# Patient Record
Sex: MALE | Race: WHITE | Employment: FULL TIME | URBAN - METROPOLITAN AREA
[De-identification: names, ages, dates, MRNs, and addresses within clinical notes are randomized per-mention and may not be internally consistent; named-entity substitution may affect disease eponyms.]

---

## 2020-05-26 ENCOUNTER — APPOINTMENT (OUTPATIENT)
Dept: GENERAL RADIOLOGY | Facility: HOSPITAL | Age: 50
End: 2020-05-26
Attending: EMERGENCY MEDICINE
Payer: COMMERCIAL

## 2020-05-26 ENCOUNTER — HOSPITAL ENCOUNTER (EMERGENCY)
Facility: HOSPITAL | Age: 50
Discharge: HOME | End: 2020-05-26
Attending: EMERGENCY MEDICINE
Payer: COMMERCIAL

## 2020-05-26 VITALS
SYSTOLIC BLOOD PRESSURE: 154 MMHG | BODY MASS INDEX: 26.51 KG/M2 | RESPIRATION RATE: 20 BRPM | OXYGEN SATURATION: 99 % | TEMPERATURE: 98 F | HEIGHT: 73 IN | DIASTOLIC BLOOD PRESSURE: 84 MMHG | WEIGHT: 200 LBS | HEART RATE: 102 BPM

## 2020-05-26 DIAGNOSIS — S61.011A LACERATION OF RIGHT THUMB WITHOUT DAMAGE TO NAIL, FOREIGN BODY PRESENCE UNSPECIFIED, INITIAL ENCOUNTER: Primary | ICD-10-CM

## 2020-05-26 PROCEDURE — 99283 EMERGENCY DEPT VISIT LOW MDM: CPT

## 2020-05-26 PROCEDURE — 12002 RPR S/N/AX/GEN/TRNK2.6-7.5CM: CPT

## 2020-05-26 PROCEDURE — 73140 X-RAY EXAM OF FINGER(S): CPT | Performed by: EMERGENCY MEDICINE

## 2020-05-26 PROCEDURE — 90471 IMMUNIZATION ADMIN: CPT

## 2020-05-26 RX ORDER — TETANUS AND DIPHTHERIA TOXOIDS ADSORBED 2; 2 [LF]/.5ML; [LF]/.5ML
0.5 INJECTION INTRAMUSCULAR ONCE
Status: COMPLETED | OUTPATIENT
Start: 2020-05-26 | End: 2020-05-26

## 2020-05-26 RX ORDER — CEPHALEXIN 500 MG/1
500 CAPSULE ORAL 4 TIMES DAILY
Qty: 20 CAPSULE | Refills: 0 | Status: SHIPPED | OUTPATIENT
Start: 2020-05-26 | End: 2020-05-31

## 2020-05-26 NOTE — ED INITIAL ASSESSMENT (HPI)
Pt cut his right thumb on a table saw 10 mins pta. He  Believes his tetanus shot is utd, pt denies any blood thinners.  Cms intact to finger

## 2020-05-26 NOTE — ED PROVIDER NOTES
Patient Seen in: [facility] AND [facility] Emergency Department      History   Patient presents with:  Laceration Abrasion    Stated Complaint:     HPI    78-year-old male presents the ER with complaint of hand laceration.   Patient states he was cutting a piece breath sounds. Abdominal:      General: Bowel sounds are normal.      Palpations: Abdomen is soft. Musculoskeletal: Normal range of motion.       Comments: 7 cm jagged laceration to the palmar aspect of the right thumb, full range of motion of the thumb Refills: 0

## 2020-05-26 NOTE — ED NOTES
Patient cleared for discharge by MD. Lorettas with patient. Patient discharge instructions reviewed with patient including when and how to follow up  with healthcare provider and when to seek medical treatment. Medication use and prescriptions reviewed.

## 2020-06-05 ENCOUNTER — HOSPITAL ENCOUNTER (OUTPATIENT)
Age: 50
Discharge: HOME | End: 2020-06-05
Payer: COMMERCIAL

## 2020-06-05 VITALS
HEART RATE: 86 BPM | OXYGEN SATURATION: 97 % | TEMPERATURE: 98 F | RESPIRATION RATE: 18 BRPM | DIASTOLIC BLOOD PRESSURE: 85 MMHG | SYSTOLIC BLOOD PRESSURE: 141 MMHG

## 2020-06-05 DIAGNOSIS — Z48.02 ENCOUNTER FOR REMOVAL OF SUTURES: ICD-10-CM

## 2020-06-05 DIAGNOSIS — S61.011D LACERATION OF RIGHT THUMB WITHOUT FOREIGN BODY WITHOUT DAMAGE TO NAIL, SUBSEQUENT ENCOUNTER: Primary | ICD-10-CM

## 2020-06-05 DIAGNOSIS — Z51.89 VISIT FOR WOUND CARE: ICD-10-CM

## 2020-06-05 PROBLEM — S61.011A LACERATION OF RIGHT THUMB WITHOUT FOREIGN BODY WITHOUT DAMAGE TO NAIL: Status: RESOLVED | Noted: 2020-06-05 | Resolved: 2020-06-05

## 2020-06-05 NOTE — ED PROVIDER NOTES
Patient Seen in: [address]      History   Patient presents with:  Suture Removal    Stated Complaint: Nacho Lutz is a 45year old male here for suture removal placed 10 days ago in the ER.  0 /10 Capillary Refill: Capillary refill takes less than 2 seconds. Findings: No bruising or erythema. Neurological:      General: No focal deficit present. Mental Status: He is alert and oriented to person, place, and time.    Psychiatric:

## 2020-06-08 ENCOUNTER — TELEPHONE (OUTPATIENT)
Dept: ORTHOPEDICS CLINIC | Facility: CLINIC | Age: 50
End: 2020-06-08

## 2020-06-08 NOTE — TELEPHONE ENCOUNTER
Spoke to patient. Patient states, \"I don't know why I need to be seen, my thumb looks fine, no redness and is healing. \"  Patient instructed if it is recommended to be seen, to make an appointment.    Patient offered an appointment to see Dr. Riaz Hilton,

## 2021-01-01 ENCOUNTER — EXTERNAL RECORD (OUTPATIENT)
Dept: HEALTH INFORMATION MANAGEMENT | Facility: OTHER | Age: 51
End: 2021-01-01

## 2021-01-20 ENCOUNTER — HOSPITAL ENCOUNTER (EMERGENCY)
Facility: HOSPITAL | Age: 51
Discharge: LEFT BEFORE DISCHARGE | DRG: 286 | End: 2021-01-20
Attending: EMERGENCY MEDICINE
Payer: COMMERCIAL

## 2021-01-20 ENCOUNTER — HOSPITAL ENCOUNTER (INPATIENT)
Facility: HOSPITAL | Age: 51
LOS: 7 days | Discharge: HOME | DRG: 286 | End: 2021-01-27
Attending: EMERGENCY MEDICINE | Admitting: HOSPITALIST
Payer: COMMERCIAL

## 2021-01-20 ENCOUNTER — HOSPITAL ENCOUNTER (OUTPATIENT)
Age: 51
Discharge: OTHER INSTITUTION | End: 2021-01-20
Payer: COMMERCIAL

## 2021-01-20 ENCOUNTER — APPOINTMENT (OUTPATIENT)
Dept: ULTRASOUND IMAGING | Facility: HOSPITAL | Age: 51
DRG: 286 | End: 2021-01-20
Attending: EMERGENCY MEDICINE
Payer: COMMERCIAL

## 2021-01-20 ENCOUNTER — APPOINTMENT (OUTPATIENT)
Dept: CT IMAGING | Facility: HOSPITAL | Age: 51
DRG: 286 | End: 2021-01-20
Attending: EMERGENCY MEDICINE
Payer: COMMERCIAL

## 2021-01-20 ENCOUNTER — APPOINTMENT (OUTPATIENT)
Dept: GENERAL RADIOLOGY | Facility: HOSPITAL | Age: 51
DRG: 286 | End: 2021-01-20
Attending: EMERGENCY MEDICINE
Payer: COMMERCIAL

## 2021-01-20 VITALS
HEIGHT: 73 IN | WEIGHT: 206 LBS | RESPIRATION RATE: 22 BRPM | OXYGEN SATURATION: 95 % | HEART RATE: 115 BPM | TEMPERATURE: 97 F | DIASTOLIC BLOOD PRESSURE: 94 MMHG | SYSTOLIC BLOOD PRESSURE: 123 MMHG | BODY MASS INDEX: 27.3 KG/M2

## 2021-01-20 VITALS
DIASTOLIC BLOOD PRESSURE: 92 MMHG | RESPIRATION RATE: 18 BRPM | HEART RATE: 124 BPM | TEMPERATURE: 98 F | OXYGEN SATURATION: 98 % | SYSTOLIC BLOOD PRESSURE: 132 MMHG

## 2021-01-20 DIAGNOSIS — I50.9 ACUTE ON CHRONIC CONGESTIVE HEART FAILURE, UNSPECIFIED HEART FAILURE TYPE (HCC): Primary | ICD-10-CM

## 2021-01-20 DIAGNOSIS — I26.93 SINGLE SUBSEGMENTAL PULMONARY EMBOLISM WITHOUT ACUTE COR PULMONALE (HCC): ICD-10-CM

## 2021-01-20 DIAGNOSIS — I26.94 MULTIPLE SUBSEGMENTAL PULMONARY EMBOLI WITHOUT ACUTE COR PULMONALE (HCC): ICD-10-CM

## 2021-01-20 DIAGNOSIS — R60.9 EDEMA, UNSPECIFIED TYPE: Primary | ICD-10-CM

## 2021-01-20 DIAGNOSIS — I50.9 ACUTE CONGESTIVE HEART FAILURE, UNSPECIFIED HEART FAILURE TYPE (HCC): Primary | ICD-10-CM

## 2021-01-20 LAB
ALBUMIN SERPL-MCNC: 3.1 G/DL (ref 3.4–5)
ALP LIVER SERPL-CCNC: 53 U/L
ALT SERPL-CCNC: 79 U/L
ANION GAP SERPL CALC-SCNC: 3 MMOL/L (ref 0–18)
ANION GAP SERPL CALC-SCNC: 6 MMOL/L (ref 0–18)
APTT PPP: 26.3 SECONDS (ref 23.2–35.3)
APTT PPP: 30 SECONDS (ref 23.2–35.3)
APTT PPP: 81.2 SECONDS (ref 23.2–35.3)
AST SERPL-CCNC: 44 U/L (ref 15–37)
BASOPHILS # BLD AUTO: 0.03 X10(3) UL (ref 0–0.2)
BASOPHILS NFR BLD AUTO: 0.5 %
BILIRUB DIRECT SERPL-MCNC: 0.4 MG/DL (ref 0–0.2)
BILIRUB SERPL-MCNC: 1 MG/DL (ref 0.1–2)
BILIRUB UR QL: NEGATIVE
BUN BLD-MCNC: 11 MG/DL (ref 7–18)
BUN BLD-MCNC: 14 MG/DL (ref 7–18)
BUN/CREAT SERPL: 11.2 (ref 10–20)
BUN/CREAT SERPL: 8.6 (ref 10–20)
CALCIUM BLD-MCNC: 8.7 MG/DL (ref 8.5–10.1)
CALCIUM BLD-MCNC: 9.1 MG/DL (ref 8.5–10.1)
CHLORIDE SERPL-SCNC: 108 MMOL/L (ref 98–112)
CHLORIDE SERPL-SCNC: 109 MMOL/L (ref 98–112)
CHOLEST SERPL-MCNC: 145 MG/DL
CHOLEST SMN-MCNC: 145 MG/DL (ref ?–200)
CLARITY UR: CLEAR
CO2 SERPL-SCNC: 27 MMOL/L (ref 21–32)
CO2 SERPL-SCNC: 27 MMOL/L (ref 21–32)
COLOR UR: COLORLESS
CREAT BLD-MCNC: 1.25 MG/DL
CREAT BLD-MCNC: 1.28 MG/DL
DEPRECATED RDW RBC AUTO: 44 FL (ref 35.1–46.3)
DEPRECATED RDW RBC AUTO: 44.8 FL (ref 35.1–46.3)
EOSINOPHIL # BLD AUTO: 0.02 X10(3) UL (ref 0–0.7)
EOSINOPHIL NFR BLD AUTO: 0.3 %
ERYTHROCYTE [DISTWIDTH] IN BLOOD BY AUTOMATED COUNT: 12.3 % (ref 11–15)
ERYTHROCYTE [DISTWIDTH] IN BLOOD BY AUTOMATED COUNT: 12.4 % (ref 11–15)
GLUCOSE BLD-MCNC: 121 MG/DL (ref 70–99)
GLUCOSE BLD-MCNC: 91 MG/DL (ref 70–99)
GLUCOSE UR-MCNC: NEGATIVE MG/DL
HCT VFR BLD AUTO: 46.4 %
HCT VFR BLD AUTO: 50.6 %
HDLC SERPL-MCNC: 36 MG/DL
HDLC SERPL-MCNC: 36 MG/DL (ref 40–59)
HGB BLD-MCNC: 15 G/DL
HGB BLD-MCNC: 16.6 G/DL
HGB UR QL STRIP.AUTO: NEGATIVE
IMM GRANULOCYTES # BLD AUTO: 0.03 X10(3) UL (ref 0–1)
IMM GRANULOCYTES NFR BLD: 0.5 %
INR BLD: 1.22 (ref 0.9–1.2)
KETONES UR-MCNC: NEGATIVE MG/DL
LDLC SERPL CALC-MCNC: 92 MG/DL
LDLC SERPL CALC-MCNC: 92 MG/DL (ref ?–100)
LEUKOCYTE ESTERASE UR QL STRIP.AUTO: NEGATIVE
LYMPHOCYTES # BLD AUTO: 0.69 X10(3) UL (ref 1–4)
LYMPHOCYTES NFR BLD AUTO: 12 %
M PROTEIN MFR SERPL ELPH: 5.8 G/DL (ref 6.4–8.2)
MCH RBC QN AUTO: 31.6 PG (ref 26–34)
MCH RBC QN AUTO: 31.7 PG (ref 26–34)
MCHC RBC AUTO-ENTMCNC: 32.3 G/DL (ref 31–37)
MCHC RBC AUTO-ENTMCNC: 32.8 G/DL (ref 31–37)
MCV RBC AUTO: 96.2 FL
MCV RBC AUTO: 98.1 FL
MONOCYTES # BLD AUTO: 0.46 X10(3) UL (ref 0.1–1)
MONOCYTES NFR BLD AUTO: 8 %
NEUTROPHILS # BLD AUTO: 4.52 X10 (3) UL (ref 1.5–7.7)
NEUTROPHILS # BLD AUTO: 4.52 X10(3) UL (ref 1.5–7.7)
NEUTROPHILS NFR BLD AUTO: 78.7 %
NITRITE UR QL STRIP.AUTO: NEGATIVE
NONHDLC SERPL-MCNC: 109 MG/DL
NONHDLC SERPL-MCNC: 109 MG/DL (ref ?–130)
NT-PROBNP SERPL-MCNC: 8193 PG/ML (ref ?–125)
OSMOLALITY SERPL CALC.SUM OF ELEC: 289 MOSM/KG (ref 275–295)
OSMOLALITY SERPL CALC.SUM OF ELEC: 292 MOSM/KG (ref 275–295)
PH UR: 6 [PH] (ref 5–8)
PLATELET # BLD AUTO: 191 10(3)UL (ref 150–450)
PLATELET # BLD AUTO: 232 10(3)UL (ref 150–450)
POTASSIUM SERPL-SCNC: 3.8 MMOL/L (ref 3.5–5.1)
POTASSIUM SERPL-SCNC: 4.3 MMOL/L (ref 3.5–5.1)
PROT UR-MCNC: NEGATIVE MG/DL
PROTHROMBIN TIME: 15.2 SECONDS (ref 11.8–14.5)
RBC # BLD AUTO: 4.73 X10(6)UL
RBC # BLD AUTO: 5.26 X10(6)UL
SARS-COV-2 RNA RESP QL NAA+PROBE: NOT DETECTED
SODIUM SERPL-SCNC: 139 MMOL/L (ref 136–145)
SODIUM SERPL-SCNC: 141 MMOL/L (ref 136–145)
SP GR UR STRIP: 1.01 (ref 1–1.03)
TRIGL SERPL-MCNC: 83 MG/DL
TRIGL SERPL-MCNC: 83 MG/DL (ref 30–149)
TROPONIN I SERPL-MCNC: 0.23 NG/ML (ref ?–0.04)
TROPONIN I SERPL-MCNC: 0.23 NG/ML (ref ?–0.04)
TROPONIN I SERPL-MCNC: 0.26 NG/ML (ref ?–0.04)
UROBILINOGEN UR STRIP-ACNC: <2
VLDLC SERPL CALC-MCNC: 17 MG/DL
VLDLC SERPL CALC-MCNC: 17 MG/DL (ref 0–30)
WBC # BLD AUTO: 5.8 X10(3) UL (ref 4–11)
WBC # BLD AUTO: 6.2 X10(3) UL (ref 4–11)

## 2021-01-20 PROCEDURE — 99254 IP/OBS CNSLTJ NEW/EST MOD 60: CPT | Performed by: INTERNAL MEDICINE

## 2021-01-20 PROCEDURE — 3079F DIAST BP 80-89 MM HG: CPT | Performed by: HOSPITALIST

## 2021-01-20 PROCEDURE — 71045 X-RAY EXAM CHEST 1 VIEW: CPT | Performed by: EMERGENCY MEDICINE

## 2021-01-20 PROCEDURE — 93005 ELECTROCARDIOGRAM TRACING: CPT

## 2021-01-20 PROCEDURE — 93010 ELECTROCARDIOGRAM REPORT: CPT | Performed by: EMERGENCY MEDICINE

## 2021-01-20 PROCEDURE — 99214 OFFICE O/P EST MOD 30 MIN: CPT | Performed by: NURSE PRACTITIONER

## 2021-01-20 PROCEDURE — 84484 ASSAY OF TROPONIN QUANT: CPT | Performed by: EMERGENCY MEDICINE

## 2021-01-20 PROCEDURE — 85025 COMPLETE CBC W/AUTO DIFF WBC: CPT | Performed by: EMERGENCY MEDICINE

## 2021-01-20 PROCEDURE — 99285 EMERGENCY DEPT VISIT HI MDM: CPT

## 2021-01-20 PROCEDURE — 3074F SYST BP LT 130 MM HG: CPT | Performed by: HOSPITALIST

## 2021-01-20 PROCEDURE — 93970 EXTREMITY STUDY: CPT | Performed by: EMERGENCY MEDICINE

## 2021-01-20 PROCEDURE — 80048 BASIC METABOLIC PNL TOTAL CA: CPT | Performed by: EMERGENCY MEDICINE

## 2021-01-20 PROCEDURE — 83880 ASSAY OF NATRIURETIC PEPTIDE: CPT | Performed by: EMERGENCY MEDICINE

## 2021-01-20 PROCEDURE — 80076 HEPATIC FUNCTION PANEL: CPT | Performed by: EMERGENCY MEDICINE

## 2021-01-20 PROCEDURE — 99213 OFFICE O/P EST LOW 20 MIN: CPT | Performed by: NURSE PRACTITIONER

## 2021-01-20 PROCEDURE — 71260 CT THORAX DX C+: CPT | Performed by: EMERGENCY MEDICINE

## 2021-01-20 PROCEDURE — 85730 THROMBOPLASTIN TIME PARTIAL: CPT | Performed by: EMERGENCY MEDICINE

## 2021-01-20 PROCEDURE — 80061 LIPID PANEL: CPT | Performed by: EMERGENCY MEDICINE

## 2021-01-20 PROCEDURE — 96374 THER/PROPH/DIAG INJ IV PUSH: CPT

## 2021-01-20 PROCEDURE — 85610 PROTHROMBIN TIME: CPT | Performed by: EMERGENCY MEDICINE

## 2021-01-20 PROCEDURE — 99223 1ST HOSP IP/OBS HIGH 75: CPT | Performed by: HOSPITALIST

## 2021-01-20 RX ORDER — HEPARIN SODIUM AND DEXTROSE 10000; 5 [USP'U]/100ML; G/100ML
INJECTION INTRAVENOUS CONTINUOUS
Status: DISCONTINUED | OUTPATIENT
Start: 2021-01-20 | End: 2021-01-20

## 2021-01-20 RX ORDER — CARVEDILOL 3.12 MG/1
3.12 TABLET ORAL 2 TIMES DAILY WITH MEALS
Status: DISCONTINUED | OUTPATIENT
Start: 2021-01-20 | End: 2021-01-23

## 2021-01-20 RX ORDER — FUROSEMIDE 10 MG/ML
40 INJECTION INTRAMUSCULAR; INTRAVENOUS ONCE
Status: COMPLETED | OUTPATIENT
Start: 2021-01-20 | End: 2021-01-20

## 2021-01-20 RX ORDER — ASPIRIN 81 MG/1
81 TABLET ORAL DAILY
Status: DISCONTINUED | OUTPATIENT
Start: 2021-01-20 | End: 2021-01-22

## 2021-01-20 RX ORDER — FUROSEMIDE 10 MG/ML
40 INJECTION INTRAMUSCULAR; INTRAVENOUS
Status: DISCONTINUED | OUTPATIENT
Start: 2021-01-21 | End: 2021-01-25

## 2021-01-20 RX ORDER — HEPARIN SODIUM AND DEXTROSE 10000; 5 [USP'U]/100ML; G/100ML
18 INJECTION INTRAVENOUS ONCE
Status: COMPLETED | OUTPATIENT
Start: 2021-01-20 | End: 2021-01-22

## 2021-01-20 RX ORDER — ASPIRIN 81 MG/1
324 TABLET, CHEWABLE ORAL ONCE
Status: COMPLETED | OUTPATIENT
Start: 2021-01-20 | End: 2021-01-20

## 2021-01-20 RX ORDER — ONDANSETRON 2 MG/ML
4 INJECTION INTRAMUSCULAR; INTRAVENOUS EVERY 6 HOURS PRN
Status: DISCONTINUED | OUTPATIENT
Start: 2021-01-20 | End: 2021-01-27

## 2021-01-20 RX ORDER — ONDANSETRON 2 MG/ML
4 INJECTION INTRAMUSCULAR; INTRAVENOUS EVERY 4 HOURS PRN
Status: DISCONTINUED | OUTPATIENT
Start: 2021-01-20 | End: 2021-01-27

## 2021-01-20 RX ORDER — FUROSEMIDE 10 MG/ML
40 INJECTION INTRAMUSCULAR; INTRAVENOUS 2 TIMES DAILY
Status: DISCONTINUED | OUTPATIENT
Start: 2021-01-20 | End: 2021-01-21

## 2021-01-20 RX ORDER — SODIUM CHLORIDE 0.9 % (FLUSH) 0.9 %
10 SYRINGE (ML) INJECTION AS NEEDED
Status: DISCONTINUED | OUTPATIENT
Start: 2021-01-20 | End: 2021-01-27

## 2021-01-20 RX ORDER — NITROGLYCERIN 0.4 MG/1
0.4 TABLET SUBLINGUAL EVERY 5 MIN PRN
Status: DISCONTINUED | OUTPATIENT
Start: 2021-01-20 | End: 2021-01-27

## 2021-01-20 RX ORDER — ACETAMINOPHEN 325 MG/1
650 TABLET ORAL EVERY 6 HOURS PRN
Status: DISCONTINUED | OUTPATIENT
Start: 2021-01-20 | End: 2021-01-27

## 2021-01-20 RX ORDER — ASPIRIN 325 MG
325 TABLET ORAL DAILY
Status: DISCONTINUED | OUTPATIENT
Start: 2021-01-20 | End: 2021-01-21

## 2021-01-20 RX ORDER — LISINOPRIL 5 MG/1
5 TABLET ORAL DAILY
Status: DISCONTINUED | OUTPATIENT
Start: 2021-01-20 | End: 2021-01-26

## 2021-01-20 NOTE — CONSULTS
[facility]    Report of Consultation    Kavon Gusjones Palm Patient Status:  Emergency    3/23/1970 MRN [id number]   Location [address] Attending Severa Rings, MD   Hosp Day # 0 PCP None Pcp     Date of Adm denies depression, anxiety  Hematologic: denies bruising        Physical Exam:   Blood pressure (!) 113/91, pulse 120, temperature 98 °F (36.7 °C), resp. rate 21, weight 206 lb (93.4 kg), SpO2 97 %.     Constitutional: no acute distress  Eyes: PERRL  ENT: n as well as small to moderate right and small left pleural effusions. Overall these findings appear to reflect CHF/fluid overload with mild suspected edema.   Patchy ground-glass opacity and nodular consolidation representing superimposed atypical viral pne

## 2021-01-20 NOTE — ED NOTES
Orders for admission, patient is aware of plan and ready to go upstairs. Any questions, please call ED RN Raegan Ivy  at extension [phone]. Pt aox4, fluids infusing for bp, on 02 2L nc, denies sob. Low suspicion for covid.

## 2021-01-20 NOTE — CM/SW NOTE
Call out to Jennifer Lan [phone] coordinator for the [employer]AF[employer] program at the [employer] for assistance with patients pet    Message left

## 2021-01-20 NOTE — ED PROVIDER NOTES
Patient Seen in: [facility]      History   Patient presents with:  Swelling Edema    Stated Complaint: Swelling below the waist down the legs    HPI/Subjective:   46 y/o male presents with c/o testicular swelling and bilateral leg swelling si Pulse (!) 124   Temp 98 °F (36.7 °C) (Temporal)   Resp 18   SpO2 98%         Physical Exam  Vitals signs and nursing note reviewed. Exam conducted with a chaperone present. Constitutional:       General: He is not in acute distress.      Appearance: He i Well appearing   Testicles swollen bilaterally.  Left and right lower extremities swollen bilaterally  Concerned about possible cardiac pathology or system organ failure   Concerns were discussed with pt and pt was instructed to go to [facility] ED for fu

## 2021-01-20 NOTE — CONSULTS
[facility]    Report of Consultation    Sharmin Palm Patient Status:  Emergency    3/23/1970 MRN [id number]   Location [address] Attending Mitzy Almeida MD   Hosp Day # 0 PCP None Pcp     Date of Adm shows sinus tachycardia with PVCs interventricular conduction delay anterior Q waves and nonspecific ST changes. CT with small right upper lobe pulmonary emboli cardiomegaly with patchy groundglass consolidation.   Past Medical History        Past Medical non-tender. No organosplenomegally, mass or rebound, BS-present. Extremities:  Without clubbing, cyanosis, 1+ swelling of  BLE's,  Peripheral pulses are 2+. Neurologic:  Alert and oriented, normal affect. No motor or coordinational deficit.   Skin:  Warm representing superimposed atypical viral pneumonia not excluded. 1.5 cm nodular subpleural opacity inferior lingula possibly a small focus of atelectasis. Short-term follow-up chest CT with contrast is advised in 1 month.   Mildly enlarged precarinal lymp

## 2021-01-20 NOTE — ED INITIAL ASSESSMENT (HPI)
PATIENT ARRIVED AMBULATORY TO ROOM. PATIENT C/O \"SWELLING\" FROM THE WAIST DOWN. PATIENT STATES HIS TESTICLES ARE SWOLLEN, BOTH OF HIS LEGS ARE SWOLLEN. NO CHEST PAIN. NO SOB. EASY NON LABORED RESPIRATIONS.

## 2021-01-20 NOTE — ED NOTES
Pt still wanting to be discharged, take care of dog at home and return to ER for admission after that. Pt aware of risk of leaving, ERMD wants pt to sign AMA. Will cont. To monitor.

## 2021-01-20 NOTE — ED INITIAL ASSESSMENT (HPI)
Patient states he was seen here for edema with diff in breathing x3 days, dx with pe and chf exacerbation, states he left earlier today to take care of his dog, states he is back to get admitted

## 2021-01-20 NOTE — ED PROVIDER NOTES
Patient Seen in: [facility] AND [facility] Emergency Department      History   Patient presents with:  Swelling Edema    Stated Complaint: edema    HPI/Subjective:   HPI  45 yoM with hyperlipidemia presenting for evaluation of lower extremity swelling.   Since Jennifer Palmer Eyes:      Extraocular Movements: Extraocular movements intact. Pupils: Pupils are equal, round, and reactive to light. Neck:      Musculoskeletal: Normal range of motion. Cardiovascular:      Rate and Rhythm: Regular rhythm.  Tachycardia present Abnormal; Notable for the following components:    HDL Cholesterol 36 (*)     All other components within normal limits   CBC W/ DIFFERENTIAL - Abnormal; Notable for the following components:    Lymphocyte Absolute 0.69 (*)     All other components within pleural effusions. Overall these findings appear to reflect CHF/fluid overload with mild suspected edema. Patchy ground-glass opacity and nodular consolidation representing superimposed atypical viral pneumonia not excluded.   1.5 cm nodular subpleural op attempted to persuade the patient to follow-up with the physician ASAP. I have also notified the patient that they may return at any time to the ED for further care.     I spent a total of 40 minutes of critical care time in obtaining history, performing a

## 2021-01-21 ENCOUNTER — APPOINTMENT (OUTPATIENT)
Dept: CV DIAGNOSTICS | Facility: HOSPITAL | Age: 51
DRG: 286 | End: 2021-01-21
Attending: NURSE PRACTITIONER
Payer: COMMERCIAL

## 2021-01-21 LAB
ALBUMIN SERPL-MCNC: 3 G/DL
ALBUMIN SERPL-MCNC: 3 G/DL (ref 3.4–5)
ALBUMIN/GLOB SERPL: 1 {RATIO}
ALBUMIN/GLOB SERPL: 1 {RATIO} (ref 1–2)
ALP LIVER SERPL-CCNC: 56 U/L
ALP SERPL-CCNC: 56 U/L
ALT SERPL-CCNC: 73 U/L
ALT SERPL-CCNC: 73 UNITS/L
ANION GAP SERPL CALC-SCNC: 7 MMOL/L
ANION GAP SERPL CALC-SCNC: 7 MMOL/L (ref 0–18)
APTT PPP: 87.8 SECONDS (ref 23.2–35.3)
AST SERPL-CCNC: 39 U/L (ref 15–37)
AST SERPL-CCNC: 39 UNITS/L
BASOPHILS # BLD AUTO: 0.05 X10(3) UL (ref 0–0.2)
BASOPHILS NFR BLD AUTO: 1.2 %
BILIRUB SERPL-MCNC: 1.4 MG/DL
BILIRUB SERPL-MCNC: 1.4 MG/DL (ref 0.1–2)
BUN BLD-MCNC: 12 MG/DL (ref 7–18)
BUN SERPL-MCNC: 12 MG/DL
BUN/CREAT SERPL: 10.1
BUN/CREAT SERPL: 10.1 (ref 10–20)
CALCIUM BLD-MCNC: 8.8 MG/DL (ref 8.5–10.1)
CALCIUM SERPL-MCNC: 8.8 MG/DL
CHLORIDE SERPL-SCNC: 107 MMOL/L
CHLORIDE SERPL-SCNC: 107 MMOL/L (ref 98–112)
CO2 SERPL-SCNC: 27 MMOL/L
CO2 SERPL-SCNC: 27 MMOL/L (ref 21–32)
CREAT BLD-MCNC: 1.19 MG/DL
CREAT SERPL-MCNC: 1.19 MG/DL
DEPRECATED RDW RBC AUTO: 44 FL (ref 35.1–46.3)
EOSINOPHIL # BLD AUTO: 0.12 X10(3) UL (ref 0–0.7)
EOSINOPHIL NFR BLD AUTO: 2.8 %
ERYTHROCYTE [DISTWIDTH] IN BLOOD BY AUTOMATED COUNT: 12.4 % (ref 11–15)
GLOBULIN PLAS-MCNC: 3 G/DL (ref 2.8–4.4)
GLOBULIN SER-MCNC: 3 G/DL
GLUCOSE BLD-MCNC: 115 MG/DL (ref 70–99)
GLUCOSE SERPL-MCNC: 115 MG/DL
HAV IGM SER QL: 2.3 MG/DL (ref 1.6–2.6)
HCT VFR BLD AUTO: 47.5 %
HGB BLD-MCNC: 15.8 G/DL
IMM GRANULOCYTES # BLD AUTO: 0.02 X10(3) UL (ref 0–1)
IMM GRANULOCYTES NFR BLD: 0.5 %
LYMPHOCYTES # BLD AUTO: 0.71 X10(3) UL (ref 1–4)
LYMPHOCYTES NFR BLD AUTO: 16.5 %
M PROTEIN MFR SERPL ELPH: 6 G/DL (ref 6.4–8.2)
MCH RBC QN AUTO: 32.1 PG (ref 26–34)
MCHC RBC AUTO-ENTMCNC: 33.3 G/DL (ref 31–37)
MCV RBC AUTO: 96.5 FL
MONOCYTES # BLD AUTO: 0.45 X10(3) UL (ref 0.1–1)
MONOCYTES NFR BLD AUTO: 10.4 %
NEUTROPHILS # BLD AUTO: 2.96 X10 (3) UL (ref 1.5–7.7)
NEUTROPHILS # BLD AUTO: 2.96 X10(3) UL (ref 1.5–7.7)
NEUTROPHILS NFR BLD AUTO: 68.6 %
OSMOLALITY SERPL CALC.SUM OF ELEC: 293 MOSM/KG (ref 275–295)
PLATELET # BLD AUTO: 197 10(3)UL (ref 150–450)
POTASSIUM SERPL-SCNC: 3.9 MMOL/L
POTASSIUM SERPL-SCNC: 3.9 MMOL/L (ref 3.5–5.1)
PROT SERPL-MCNC: 6 G/DL
RBC # BLD AUTO: 4.92 X10(6)UL
SODIUM SERPL-SCNC: 141 MMOL/L
SODIUM SERPL-SCNC: 141 MMOL/L (ref 136–145)
TROPONIN I SERPL-MCNC: 0.19 NG/ML (ref ?–0.04)
TSI SER-ACNC: 1.02 MIU/ML (ref 0.36–3.74)
VIT B12 SERPL-MCNC: 905 PG/ML (ref 193–986)
WBC # BLD AUTO: 4.3 X10(3) UL (ref 4–11)

## 2021-01-21 PROCEDURE — 99233 SBSQ HOSP IP/OBS HIGH 50: CPT | Performed by: HOSPITALIST

## 2021-01-21 PROCEDURE — 93306 TTE W/DOPPLER COMPLETE: CPT | Performed by: NURSE PRACTITIONER

## 2021-01-21 PROCEDURE — 99232 SBSQ HOSP IP/OBS MODERATE 35: CPT | Performed by: INTERNAL MEDICINE

## 2021-01-21 PROCEDURE — 3079F DIAST BP 80-89 MM HG: CPT | Performed by: INTERNAL MEDICINE

## 2021-01-21 PROCEDURE — 3074F SYST BP LT 130 MM HG: CPT | Performed by: INTERNAL MEDICINE

## 2021-01-21 RX ORDER — SODIUM CHLORIDE 9 MG/ML
INJECTION, SOLUTION INTRAVENOUS
Status: COMPLETED | OUTPATIENT
Start: 2021-01-22 | End: 2021-01-22

## 2021-01-21 RX ORDER — ATORVASTATIN CALCIUM 80 MG/1
80 TABLET, FILM COATED ORAL NIGHTLY
Status: DISCONTINUED | OUTPATIENT
Start: 2021-01-21 | End: 2021-01-27

## 2021-01-21 RX ORDER — CHLORHEXIDINE GLUCONATE 4 G/100ML
30 SOLUTION TOPICAL
Status: COMPLETED | OUTPATIENT
Start: 2021-01-22 | End: 2021-01-21

## 2021-01-21 RX ORDER — HEPARIN SODIUM AND DEXTROSE 10000; 5 [USP'U]/100ML; G/100ML
INJECTION INTRAVENOUS CONTINUOUS
Status: DISPENSED | OUTPATIENT
Start: 2021-01-21 | End: 2021-01-23

## 2021-01-21 NOTE — PLAN OF CARE
Rosy Stout was admitted from home for increased lower extremity swelling x3 days. He is on a heparin drip for a pulmonary embolism and received IV lasix. Plan is for medication management and echocardiogram tomorrow.    Problem: Patient Centered Care  Goal: Jonathan

## 2021-01-21 NOTE — PLAN OF CARE
Sherri Stanford went for an echocardiogram today. He is receiving IV heparin. Plan is to recheck PTT in the morning and heart failure to  be managed per cardiology.   Problem: Patient Centered Care  Goal: Patient preferences are identified and integrated in the patien

## 2021-01-21 NOTE — PROGRESS NOTES
[place] [facility]    Progress Note    Matt Jimenez Arpita Patient Status:  Inpatient    3/23/1970 MRN [id number]   Location [facility] 3W/SW Attending Raphael Riley MD   Hosp Day # 1 PCP None Pcp         Assessment and Plan:    Acute lesions    Scheduled Meds:    • carvedilol  3.125 mg Oral BID with meals   • lisinopril  5 mg Oral Daily   • furosemide  40 mg Intravenous BID   • aspirin  325 mg Oral Daily   • furosemide  40 mg Intravenous BID (Diuretic)   • aspirin  81 mg Oral Daily excluded. 1.5 cm nodular subpleural opacity inferior lingula possibly a small focus of atelectasis. Short-term follow-up chest CT with contrast is advised in 1 month. Mildly enlarged precarinal lymph node is nonspecific but may be reactive.   Attention o

## 2021-01-21 NOTE — ED PROVIDER NOTES
Patient Seen in: [facility] AND [facility] Emergency Department    History   Patient presents with:  Pulmonary Embolism (PE)      HPI    Patient presents to the ED complaining of ongoing shortness of breath for the past 3 days.   Was seen in this ER earlier today BP 01/20/21 1521 121/87   Pulse 01/20/21 1521 (!) 150   Resp 01/20/21 1521 18   Temp 01/20/21 1521 98 °F (36.7 °C)   Temp src 01/20/21 1702 Oral   SpO2 01/20/21 1521 98 %   O2 Device 01/20/21 1702 Nasal cannula       All measures to prevent infection tra URINALYSIS, ROUTINE - Abnormal; Notable for the following components:    Urine Color Colorless (*)     All other components within normal limits   PTT, ACTIVATED - Normal   BASIC METABOLIC PANEL (8) - Normal   CBC, PLATELET; NO DIFFERENTIAL - Normal   RA Dictated by (CST): Rhiannon Oconnell MD on 1/20/2021 at 11:34 AM     Finalized by (CST): Rhiannon Oconnell MD on 1/20/2021 at 11:53 AM          ED Medications Administered:   Medications   ondansetron HCl (ZOFRAN) injection 4 mg (has no administration in t records for any recent pertinent discharge summaries, testing, and procedures and reviewed those reports. Complicating Factors: The patient already has does not have any pertinent problems on file. to contribute to the complexity of this ED evaluation.

## 2021-01-21 NOTE — SIGNIFICANT EVENT
Dr. Cobian Keto notified of critical troponin. Patient is on heparin drip. No orders at this time. Will continue to watch.

## 2021-01-21 NOTE — PROGRESS NOTES
[facility]    Progress Note    Aureliano Granda José Miguelkane Patient Status:  Inpatient    3/23/1970 MRN [id number]   Location [facility] 3W/ Attending Ambar Sterling MD   Hosp Day # 1 PCP None Pcp       Subjective:     Very good respo HGB 15.8 01/21/2021    HCT 47.5 01/21/2021    .0 01/21/2021    CREATSERUM 1.19 01/21/2021    BUN 12 01/21/2021     01/21/2021    K 3.9 01/21/2021     01/21/2021    CO2 27.0 01/21/2021     (H) 01/21/2021    CA 8.8 01/21/2021 discussed with Dr. Lelo Lovell at 11:50 a.m. 01/20/2021.      Dictated by (CST): Caldwell Cushing, MD on 1/20/2021 at 11:34 AM     Finalized by (CST): Caldwell Cushing, MD on 1/20/2021 at 11:53 AM          Ekg 12-lead    Result Date: 1/20/2021  ECG Report  Inter

## 2021-01-21 NOTE — CARDIAC REHAB
CARDIAC REHAB HEART FAILURE EDUCATION    Handouts provided and reviewed: CHF Booklet.      Activity: Chair for all meals: YES       Ambulation: YES       Tolerated Activity: UP IN ROOM TO BATHROOM, TOLERATED WELL         Disease Process: Disease process rev

## 2021-01-21 NOTE — PAYOR COMM NOTE
--------------  ADMISSION REVIEW     Payor: DAIANA HUNTER  Subscriber #:  [id number]  Authorization Number: [id number]    Admit date: 1/20/21  Admit time: 8650       Patient Seen in: [facility] Emergency Department    History   Patient presents with: P discharge. Neck: No tracheal deviation present. Cardiovascular: Normal rate and intact distal pulses. Pulmonary/Chest: Effort normal. No stridor. No respiratory distress. Abdominal: Soft. Bowel sounds are normal. He exhibits no distension.    Muscul representing superimposed atypical viral pneumonia not excluded. 1.5 cm nodular subpleural opacity inferior lingula possibly a small focus of atelectasis. Short-term follow-up chest CT with contrast is advised in 1 month.   Mildly enlarged precarinal lymp into the emergency department earlier today with progressive shortness of breath and leg swelling, scrotum swelling, symptoms being progressive for several weeks. He used to be a heavy alcohol drinker and stopped 3 weeks ago but symptoms persisted.   He ha nondistended, no tenderness. Positive bowel sounds. EXTREMITIES:  +3 edema both legs, no clubbing or cyanosis. NEUROLOGIC:  Motor and sensory intact. Cranial nerves II-XII are intact. ASSESSMENT AND PLAN:    1.    Clinical picture consistent with car shortness of breath and leg and scrotal swelling. Patient still works but is an active after work. He has no chest pain palpitations or dizziness.   He came to the ER earlier today and was found of a blood clot where he was started on Xarelto and went vera 0.259      CARDS:    Assessment and Plan:    Acute on chronic congestive heart failure ([facility] [address]  Patient with findings consistent with acute heart failure with leg swelling/scrotal and shortness of breath of unclear etiology. Weight down 11 pounds.   Continue Electronically signed on 01/20/2021    Lab Results   Component Value Date     WBC 4.3 01/21/2021     HGB 15.8 01/21/2021     HCT 47.5 01/21/2021     .0 01/21/2021     CREATSERUM 1.19 01/21/2021     BUN 12 01/21/2021      01/21/2021     K 3.9 0 1.5 mL     Date Action Dose Route User    1/21/2021 1005 Given 1.5 mL Intravenous Yolande Dewitt      rivaroxaban (Enrike Manzo) tab 15 mg     Date Action Dose Route User    Admitted on 1/20/2021    Discharged on 1/20/2021 1/20/2021 1242 Given 15 mg Oral M

## 2021-01-21 NOTE — PROGRESS NOTES
[place] [facility] - [facility]     Progress Note        Pamela Thomas Arpita Patient Status:  Inpatient    3/23/1970 MRN [id number]   Location [facility] 3W/SW Attending Nash Hagen MD   Hosp Day # 1 PCP None Pcp       Subjective:   Patient seen pateint  Cardio: RRR, S1 S2  Respiratory: clear to auscultation bilaterally, no wheezing, rales, rhonchi, crackles  GI: abdomen soft, non tender  Extremities: no clubbing, cyanosis, + bilateral lower extremity edema  Neurologic: no gross motor deficits  Sk nodular subpleural opacity inferior lingula possibly a small focus of atelectasis. Short-term follow-up chest CT with contrast is advised in 1 month. Mildly enlarged precarinal lymph node is nonspecific but may be reactive.   Attention on short-term follo

## 2021-01-21 NOTE — H&P
[facility]    PATIENT'S NAME: Nancy LOZANO   ATTENDING PHYSICIAN: Adriane Rogers.  Joavnna Matt MD   PATIENT ACCOUNT#:   [id number]    LOCATION:  [place] [address]  MEDICAL RECORD #:   [id number]       YOB: 1970  ADMISSION DATE:       01 scrotum swelling. Other 12-point review of systems negative. PHYSICAL EXAMINATION:    GENERAL:  Alert and oriented to time, place, and person, in moderate distress.   VITAL SIGNS:  Temperature is 98.0, pulse 97, respiratory rate 17, blood pressure 115

## 2021-01-22 ENCOUNTER — APPOINTMENT (OUTPATIENT)
Dept: INTERVENTIONAL RADIOLOGY/VASCULAR | Facility: HOSPITAL | Age: 51
DRG: 286 | End: 2021-01-22
Attending: NURSE PRACTITIONER
Payer: COMMERCIAL

## 2021-01-22 LAB
ANION GAP SERPL CALC-SCNC: 7 MMOL/L (ref 0–18)
APTT PPP: 124.7 SECONDS (ref 23.2–35.3)
APTT PPP: 96.9 SECONDS (ref 23.2–35.3)
BUN BLD-MCNC: 15 MG/DL (ref 7–18)
BUN/CREAT SERPL: 13.2 (ref 10–20)
CALCIUM BLD-MCNC: 9 MG/DL (ref 8.5–10.1)
CHLORIDE SERPL-SCNC: 107 MMOL/L (ref 98–112)
CO2 SERPL-SCNC: 27 MMOL/L (ref 21–32)
CREAT BLD-MCNC: 1.14 MG/DL
DEPRECATED RDW RBC AUTO: 43.9 FL (ref 35.1–46.3)
ERYTHROCYTE [DISTWIDTH] IN BLOOD BY AUTOMATED COUNT: 12.3 % (ref 11–15)
GLUCOSE BLD-MCNC: 86 MG/DL (ref 70–99)
HCT VFR BLD AUTO: 48.8 %
HGB BLD-MCNC: 16 G/DL
MCH RBC QN AUTO: 31.6 PG (ref 26–34)
MCHC RBC AUTO-ENTMCNC: 32.8 G/DL (ref 31–37)
MCV RBC AUTO: 96.4 FL
OSMOLALITY SERPL CALC.SUM OF ELEC: 292 MOSM/KG (ref 275–295)
PLATELET # BLD AUTO: 179 10(3)UL (ref 150–450)
PLATELET # BLD AUTO: 201 10(3)UL (ref 150–450)
POTASSIUM SERPL-SCNC: 3.7 MMOL/L (ref 3.5–5.1)
RBC # BLD AUTO: 5.06 X10(6)UL
SODIUM SERPL-SCNC: 141 MMOL/L (ref 136–145)
WBC # BLD AUTO: 3.5 X10(3) UL (ref 4–11)

## 2021-01-22 PROCEDURE — 3074F SYST BP LT 130 MM HG: CPT | Performed by: INTERNAL MEDICINE

## 2021-01-22 PROCEDURE — 3079F DIAST BP 80-89 MM HG: CPT | Performed by: INTERNAL MEDICINE

## 2021-01-22 PROCEDURE — 99233 SBSQ HOSP IP/OBS HIGH 50: CPT | Performed by: HOSPITALIST

## 2021-01-22 PROCEDURE — 99232 SBSQ HOSP IP/OBS MODERATE 35: CPT | Performed by: INTERNAL MEDICINE

## 2021-01-22 PROCEDURE — B2111ZZ FLUOROSCOPY OF MULTIPLE CORONARY ARTERIES USING LOW OSMOLAR CONTRAST: ICD-10-PCS | Performed by: INTERNAL MEDICINE

## 2021-01-22 PROCEDURE — 4A023N8 MEASUREMENT OF CARDIAC SAMPLING AND PRESSURE, BILATERAL, PERCUTANEOUS APPROACH: ICD-10-PCS | Performed by: INTERNAL MEDICINE

## 2021-01-22 RX ORDER — DIPHENHYDRAMINE HYDROCHLORIDE 50 MG/ML
INJECTION INTRAMUSCULAR; INTRAVENOUS
Status: COMPLETED
Start: 2021-01-22 | End: 2021-01-22

## 2021-01-22 RX ORDER — MIDAZOLAM HYDROCHLORIDE 1 MG/ML
INJECTION INTRAMUSCULAR; INTRAVENOUS
Status: COMPLETED
Start: 2021-01-22 | End: 2021-01-22

## 2021-01-22 RX ORDER — LIDOCAINE HYDROCHLORIDE 20 MG/ML
INJECTION, SOLUTION EPIDURAL; INFILTRATION; INTRACAUDAL; PERINEURAL
Status: COMPLETED
Start: 2021-01-22 | End: 2021-01-22

## 2021-01-22 RX ORDER — ASPIRIN 325 MG
325 TABLET, DELAYED RELEASE (ENTERIC COATED) ORAL DAILY
Status: DISCONTINUED | OUTPATIENT
Start: 2021-01-22 | End: 2021-01-23

## 2021-01-22 NOTE — PROCEDURES
[facility]  [facility] Cardiac Cath Procedure Note  Dane Gio Arpita Patient Status:  Inpatient    3/23/1970 MRN [id number]   Location [facility] Attending Chika Judge MD   Hosp Day # 2 PCP None Pcp       Cardio and moderate conscious sedation of versed and fentanyl was given. Patient was assessed and monitoring of oxygen, heart rate and blood pressure by nurse and myself during the exam 26 minutes.       Angy Anton MD  01/22/21

## 2021-01-22 NOTE — PROGRESS NOTES
[facility]    Progress Note    Ryann Palm Patient Status:  Inpatient    3/23/1970 MRN [id number]   Location [facility] 3W/SW Attending Brian Shea MD   Hosp Day # 2 PCP None Pcp       Subjective:     Very good respo above.        Results:     Lab Results   Component Value Date    WBC 4.3 01/21/2021    HGB 15.8 01/21/2021    HCT 47.5 01/21/2021    .0 01/22/2021    CREATSERUM 1.14 01/22/2021    BUN 15 01/22/2021     01/22/2021    K 3.7 01/22/2021

## 2021-01-22 NOTE — PROGRESS NOTES
[place] [facility] - [facility]     Progress Note        Janice Sherman Arpita Patient Status:  Inpatient    3/23/1970 MRN [id number]   Location [facility] 3W/SW Attending Mery Leary MD   Hosp Day # 2 PCP None Pcp       Subjective:   Patient seen wheezing, rales, rhonchi, crackles  GI: abdomen soft, non tender  Extremities: no clubbing, cyanosis, + bilateral lower extremity edema  Neurologic: no gross motor deficits  Skin: warm, dry      Results:     Lab Results   Component Value Date    WBC 4.3 01 lingula possibly a small focus of atelectasis. Short-term follow-up chest CT with contrast is advised in 1 month. Mildly enlarged precarinal lymph node is nonspecific but may be reactive. Attention on short-term follow-up chest CT.   Findings were discus cath later today.  -Diuresis per cardiology recommendation  -Lower extremity venous Doppler negative for DVT    Julianna Ny,   Pulmonary [address]

## 2021-01-22 NOTE — PLAN OF CARE
Pt axox4. Independent. RA. ST on tele. Heparin gtt running at 17ml/hr. PPT to be drawn in am. NPO at 0000 for cardiac cath. Hibiclens completed 2300. NS running at 20ml/hr. Consent in chart.     Problem: Patient Centered Care  Goal: Patient preferences are

## 2021-01-23 LAB
ANION GAP SERPL CALC-SCNC: 5 MMOL/L (ref 0–18)
APTT PPP: 83.6 SECONDS (ref 23.2–35.3)
BUN BLD-MCNC: 18 MG/DL (ref 7–18)
BUN/CREAT SERPL: 14.8 (ref 10–20)
CALCIUM BLD-MCNC: 9.7 MG/DL (ref 8.5–10.1)
CHLORIDE SERPL-SCNC: 109 MMOL/L (ref 98–112)
CO2 SERPL-SCNC: 26 MMOL/L (ref 21–32)
CREAT BLD-MCNC: 1.22 MG/DL
DEPRECATED RDW RBC AUTO: 44.5 FL (ref 35.1–46.3)
ERYTHROCYTE [DISTWIDTH] IN BLOOD BY AUTOMATED COUNT: 12.3 % (ref 11–15)
GLUCOSE BLD-MCNC: 153 MG/DL (ref 70–99)
HCT VFR BLD AUTO: 52.2 %
HGB BLD-MCNC: 16.6 G/DL
MCH RBC QN AUTO: 31.3 PG (ref 26–34)
MCHC RBC AUTO-ENTMCNC: 31.8 G/DL (ref 31–37)
MCV RBC AUTO: 98.5 FL
OSMOLALITY SERPL CALC.SUM OF ELEC: 295 MOSM/KG (ref 275–295)
PLATELET # BLD AUTO: 199 10(3)UL (ref 150–450)
PLATELET # BLD AUTO: 209 10(3)UL (ref 150–450)
POTASSIUM SERPL-SCNC: 4.2 MMOL/L (ref 3.5–5.1)
RBC # BLD AUTO: 5.3 X10(6)UL
SODIUM SERPL-SCNC: 140 MMOL/L (ref 136–145)
WBC # BLD AUTO: 4.6 X10(3) UL (ref 4–11)

## 2021-01-23 PROCEDURE — 99232 SBSQ HOSP IP/OBS MODERATE 35: CPT | Performed by: HOSPITALIST

## 2021-01-23 PROCEDURE — 3074F SYST BP LT 130 MM HG: CPT | Performed by: INTERNAL MEDICINE

## 2021-01-23 PROCEDURE — 3079F DIAST BP 80-89 MM HG: CPT | Performed by: INTERNAL MEDICINE

## 2021-01-23 PROCEDURE — 99232 SBSQ HOSP IP/OBS MODERATE 35: CPT | Performed by: INTERNAL MEDICINE

## 2021-01-23 RX ORDER — CARVEDILOL 6.25 MG/1
6.25 TABLET ORAL 2 TIMES DAILY WITH MEALS
Status: DISCONTINUED | OUTPATIENT
Start: 2021-01-23 | End: 2021-01-23

## 2021-01-23 RX ORDER — ASPIRIN 81 MG/1
81 TABLET ORAL DAILY
Status: DISCONTINUED | OUTPATIENT
Start: 2021-01-24 | End: 2021-01-27

## 2021-01-23 RX ORDER — CARVEDILOL 3.12 MG/1
3.12 TABLET ORAL 2 TIMES DAILY WITH MEALS
Status: DISCONTINUED | OUTPATIENT
Start: 2021-01-23 | End: 2021-01-24

## 2021-01-23 RX ORDER — FOLIC ACID 1 MG/1
1 TABLET ORAL DAILY
Status: DISCONTINUED | OUTPATIENT
Start: 2021-01-23 | End: 2021-01-27

## 2021-01-23 RX ORDER — MELATONIN
100 DAILY
Status: DISCONTINUED | OUTPATIENT
Start: 2021-01-23 | End: 2021-01-27

## 2021-01-23 NOTE — PROGRESS NOTES
[facility]    Progress Note    Azalea Palm Patient Status:  Inpatient    3/23/1970 MRN [id number]   Location [facility] 3W/SW Attending Rahul Che MD   Hosp Day # 3 PCP None Pcp       Subjective:     Very good respo note (pt works as a )    dw pt at [facility], especially current results, and treatment plans          Results:     Lab Results   Component Value Date    WBC 3.5 (L) 01/22/2021    HGB 16.0 01/22/2021    HCT 48.8 01/22/2021    .0 01/23/2021    CR

## 2021-01-23 NOTE — PROGRESS NOTES
[facility]    Progress Note      Assessment and Plan:   1. Venous thromboembolism–laying on IV heparin. Okay from my perspective to transition to Xarelto.     Recommendations:  Xarelto, stop heparin 2 hours after first dose, will follow cl

## 2021-01-23 NOTE — PLAN OF CARE
VSS, BP consistent on low side. Room air. R/L heart cath today without intervention. Right femoral insertion sites, without indication of bleeding at this time, peripheral vascular intact. Up self, voiding with urinal. Cont Heparin gtt and IV lasix.     Pro edema, trend weights  Outcome: Progressing  Goal: Absence of cardiac arrhythmias or at baseline  Description: INTERVENTIONS:  - Continuous cardiac monitoring, monitor vital signs, obtain 12 lead EKG if indicated  - Evaluate effectiveness of antiarrhythmic blowing  Outcome: Progressing

## 2021-01-23 NOTE — CM/SW NOTE
Received MDO for Xarelto and Eliquis coverage. SW contacted pt's pharmacy [email or web address]) at: [phone] and had coverage run for Rx per MD request.    For Xarelto 20mg/day and Eliquis 5mg BID - 30 day supplies, pt will have cost of $30/each medication.

## 2021-01-23 NOTE — PROGRESS NOTES
· [facility]      Subjective:  Dyspnea improved no chest pain    Objective:  BP 91/73 (BP Location: Left arm)   Pulse 95   Temp 98.1 °F (36.7 °C) (Oral)   Resp 18   Wt 187 lb 12.8 oz (85.2 kg)   SpO2 94%   BMI 24.78 kg/m²    Telemetry SR/ST NS

## 2021-01-23 NOTE — PLAN OF CARE
S/p cath - SBP dropped from 100s to 80s. Pt asymptomatic; EF 10%. MD notified. STAT CBC; Hgb 16.0. No new orders. Hep gtt cont'd. Bed locked and in lowest position at all times. Call light within reach. Will continue to monitor this shift.      Problem: Andrea Nielson trend weights  Outcome: Progressing  Goal: Absence of cardiac arrhythmias or at baseline  Description: INTERVENTIONS:  - Continuous cardiac monitoring, monitor vital signs, obtain 12 lead EKG if indicated  - Evaluate effectiveness of antiarrhythmic and hea blowing  Outcome: Progressing

## 2021-01-24 LAB
ANION GAP SERPL CALC-SCNC: 8 MMOL/L (ref 0–18)
APTT PPP: 31.4 SECONDS (ref 23.2–35.3)
BUN BLD-MCNC: 17 MG/DL (ref 7–18)
BUN/CREAT SERPL: 13.9 (ref 10–20)
CALCIUM BLD-MCNC: 9.5 MG/DL (ref 8.5–10.1)
CHLORIDE SERPL-SCNC: 105 MMOL/L (ref 98–112)
CO2 SERPL-SCNC: 26 MMOL/L (ref 21–32)
CREAT BLD-MCNC: 1.22 MG/DL
GLUCOSE BLD-MCNC: 102 MG/DL (ref 70–99)
OSMOLALITY SERPL CALC.SUM OF ELEC: 290 MOSM/KG (ref 275–295)
POTASSIUM SERPL-SCNC: 4.3 MMOL/L (ref 3.5–5.1)
SODIUM SERPL-SCNC: 139 MMOL/L (ref 136–145)

## 2021-01-24 PROCEDURE — 3079F DIAST BP 80-89 MM HG: CPT | Performed by: INTERNAL MEDICINE

## 2021-01-24 PROCEDURE — 99232 SBSQ HOSP IP/OBS MODERATE 35: CPT | Performed by: HOSPITALIST

## 2021-01-24 PROCEDURE — 99232 SBSQ HOSP IP/OBS MODERATE 35: CPT | Performed by: INTERNAL MEDICINE

## 2021-01-24 PROCEDURE — 3074F SYST BP LT 130 MM HG: CPT | Performed by: INTERNAL MEDICINE

## 2021-01-24 NOTE — PROGRESS NOTES
[facility]     [facility] Cardiology Progress Note    Janeen Palm Patient Status:  Inpatient    3/23/1970 MRN [id number]   Location [facility] 3W/SW Attending Teresa Dial MD   Hosp Day # 4 PCP None Pcp     Subjective: 8.7   < > 8.8 9.0 9.7 9.5   ALB 3.1*  --  3.0*  --   --   --       < > 141 141 140 139   K 4.3   < > 3.9 3.7 4.2 4.3      < > 107 107 109 105   CO2 27.0   < > 27.0 27.0 26.0 26.0   ALKPHO 53  --  56  --   --   --    AST 44*  --  39*  --   -- atrium was mildly dilated. 5. Tricuspid valve: Mild-moderate regurgitation. No previous study was available for comparison. Assessment and Plan:     · HFrEF 2/2 NICM 10%. Non obstructive CAD [facility] 1/22 PCWP 20 CI 1.7.  Admits to heavy etoh use for y

## 2021-01-24 NOTE — PLAN OF CARE
Problem: Patient Centered Care  Goal: Patient preferences are identified and integrated in the patient's plan of care  Description: Interventions:  - What would you like us to know as we care for you?  I work in the lab at [address] [place] [address] timely, antiarrhythmic and heart rate control medications as ordered  - Initiate emergency measures for life threatening arrhythmias  - Monitor electrolytes and administer replacement therapy as ordered  Outcome: Progressing     Problem: SKIN/TISSUE INTEGRITY - AD Patient ambulated in [place] today twice.

## 2021-01-24 NOTE — PROGRESS NOTES
[facility]     Progress Note        Chatorosanne Snyder Adaingris Patient Status:  Inpatient    3/23/1970 MRN [id number]   Location [facility] 3W/SW Attending Rahul Che MD   Hosp Day # 4 PCP None Pcp       Subjective:   Patient seen auscultation bilaterally, no wheezing, rales, rhonchi, crackles  GI: abdomen soft, non tender  Extremities: no clubbing, cyanosis, + bilateral lower extremity edema  Neurologic: no gross motor deficits  Skin: warm, dry      Results:     Lab Results   Decker

## 2021-01-24 NOTE — PLAN OF CARE
Problem: Patient Centered Care  Goal: Patient preferences are identified and integrated in the patient's plan of care  Description: Interventions:  - What would you like us to know as we care for you?  I work in the lab at [address] [place] [address] timely, antiarrhythmic and heart rate control medications as ordered  - Initiate emergency measures for life threatening arrhythmias  - Monitor electrolytes and administer replacement therapy as ordered  Outcome: Progressing     Problem: HEMATOLOGIC - ADULT  Goal:

## 2021-01-25 LAB
ANION GAP SERPL CALC-SCNC: 5 MMOL/L (ref 0–18)
BUN BLD-MCNC: 19 MG/DL (ref 7–18)
BUN/CREAT SERPL: 14.4 (ref 10–20)
CALCIUM BLD-MCNC: 9.5 MG/DL (ref 8.5–10.1)
CHLORIDE SERPL-SCNC: 103 MMOL/L (ref 98–112)
CO2 SERPL-SCNC: 28 MMOL/L (ref 21–32)
CREAT BLD-MCNC: 1.32 MG/DL
GLUCOSE BLD-MCNC: 93 MG/DL (ref 70–99)
HAV IGM SER QL: 2.5 MG/DL (ref 1.6–2.6)
OSMOLALITY SERPL CALC.SUM OF ELEC: 284 MOSM/KG (ref 275–295)
POTASSIUM SERPL-SCNC: 4.3 MMOL/L (ref 3.5–5.1)
SODIUM SERPL-SCNC: 136 MMOL/L (ref 136–145)

## 2021-01-25 PROCEDURE — 3074F SYST BP LT 130 MM HG: CPT | Performed by: HOSPITALIST

## 2021-01-25 PROCEDURE — 99232 SBSQ HOSP IP/OBS MODERATE 35: CPT | Performed by: HOSPITALIST

## 2021-01-25 PROCEDURE — 3079F DIAST BP 80-89 MM HG: CPT | Performed by: HOSPITALIST

## 2021-01-25 RX ORDER — METOPROLOL SUCCINATE 25 MG/1
25 TABLET, EXTENDED RELEASE ORAL
Status: DISCONTINUED | OUTPATIENT
Start: 2021-01-25 | End: 2021-01-26

## 2021-01-25 RX ORDER — SPIRONOLACTONE 25 MG/1
12.5 TABLET ORAL DAILY
Status: DISCONTINUED | OUTPATIENT
Start: 2021-01-25 | End: 2021-01-27

## 2021-01-25 RX ORDER — CETIRIZINE HYDROCHLORIDE 5 MG/1
5 TABLET ORAL
Status: DISCONTINUED | OUTPATIENT
Start: 2021-01-25 | End: 2021-01-27

## 2021-01-25 NOTE — PLAN OF CARE
Plan to transition to PO Lasix & discharge with LifeVest.  Bed locked and in lowest position at all times. Call light within reach. Pt aware of POC; no further concerns at this time. Will continue to monitor this shift.      Problem: Patient Centered Care weights  Outcome: Progressing  Goal: Absence of cardiac arrhythmias or at baseline  Description: INTERVENTIONS:  - Continuous cardiac monitoring, monitor vital signs, obtain 12 lead EKG if indicated  - Evaluate effectiveness of antiarrhythmic and heart rat Progressing

## 2021-01-25 NOTE — PAYOR COMM NOTE
--------------  1/24-25 CONTINUED STAY REVIEW    Payor: [facility]  Subscriber #:  [id number]  Authorization Number: [id number]        CARDS:    Subjective:  Tele with sinus tachycardia, NSVT. Net -2.8L yesterday although Ins not well documented.  Cr stable obstructive disease, supplies PDA and   PL   Left main:  Patent, free of obstructive disease   Left anterior descending:  Patent and free of obstructive   disease, supplies two diagonals which are non-obstructive   Circumflex: Codominant patent and free of outpt       HOSPITALIST:    Assessment and Plan:      New severe cardiomyopathy  ECHO shows dilated CM with EF 47%  Acute systolic CHF  Abn troponins  NSVT  Good response to iv lasix  TSH, B12 nl  -I&O, daily wt  -HF education  -cardiology following  -s/p --  3.0*  --   --   --   --       < > 141   < > 140 139 136   K 4.3   < > 3.9   < > 4.2 4.3 4.3      < > 107   < > 109 105 103   CO2 27.0   < > 27.0   < > 26.0 26.0 28.0       Assessment and Plan:     · HFrEF 2/2 NICM 10%.  Non obstructive CAD User    1/24/2021 1720 Given 40 mg Intravenous Didier Valenzuela, RN      ivabradine [facility]) tablet 2.5 mg     Date Action Dose Route User    1/25/2021 1151 Given 2.5 mg Oral Margareth Patel RN      lisinopril tab 5 mg     Date Action Dose Route User

## 2021-01-25 NOTE — PROGRESS NOTES
[facility]     [facility] Cardiology Progress Note    Aureliano Granda Adaingris Patient Status:  Inpatient    3/23/1970 MRN [id number]   Location [facility] 3W/SW Attending Ambar Sterling MD   Hosp Day # 5 PCP None Pcp     Subjective: < > 81   < > 79 79 72   GFRNAA 64   < > 70   < > 68 68 62   CA 8.7   < > 8.8   < > 9.7 9.5 9.5   ALB 3.1*  --  3.0*  --   --   --   --       < > 141   < > 140 139 136   K 4.3   < > 3.9   < > 4.2 4.3 4.3      < > 107   < > 109 105 103   CO2 27. 0 regurgitation. 3. Mitral valve: Moderate regurgitation directed eccentrically and      posteriorly. 4. Left atrium: The atrium was mildly dilated. 5. Tricuspid valve: Mild-moderate regurgitation. No previous study was available for comparison.

## 2021-01-25 NOTE — PLAN OF CARE
- LifeVest order faxed today at 1:20pm.  Indicated possible discharge date of 01/26/2021. Rep Prashanth Galvez notified.

## 2021-01-25 NOTE — PLAN OF CARE
Problem: Patient Centered Care  Goal: Patient preferences are identified and integrated in the patient's plan of care  Description: Interventions:  - What would you like us to know as we care for you?  I work in the lab at [address] [place] [address] timely, antiarrhythmic and heart rate control medications as ordered  - Initiate emergency measures for life threatening arrhythmias  - Monitor electrolytes and administer replacement therapy as ordered  Outcome: Progressing     Problem: SKIN/TISSUE INTEGRITY - AD possibly tomorrow. Call light/belongings within reach, bed locked and in lowest position, non skid socks on, frequent nursing rounds.  Will continue to monitor

## 2021-01-25 NOTE — PLAN OF CARE
6 beats vtach on telemetry. Asymptomatic. BP at 90's. Dr. Ronald Maya who was on the floor was notified. Metoprolol Succinate to start tonight. Lasix IV discontinued. Aldactone and Corlanor was ordered.  Plan to send home pt with Life Vest. Forms signed by Dr. Aurora Gautam

## 2021-01-25 NOTE — PROGRESS NOTES
[facility]    Progress Note    Marie Jewell Arpita Patient Status:  Inpatient    3/23/1970 MRN [id number]   Location [facility] 3W/ Attending Lynette Ortez MD   Hosp Day # 4 PCP None Pcp       Subjective:     Very good respo interval follow up CT    Heavy ETOH use  Quit recently at the onset of symptoms  No evidence of withdrawal  LFT slightly elevated, ALT~73    Dispo: pending, may need excuse from work note or short term disability pending cardiology recommendations (pt work

## 2021-01-26 LAB
DEPRECATED HBV CORE AB SER IA-ACNC: 131.7 NG/ML
DEPRECATED RDW RBC AUTO: 42.5 FL (ref 35.1–46.3)
ERYTHROCYTE [DISTWIDTH] IN BLOOD BY AUTOMATED COUNT: 11.9 % (ref 11–15)
ERYTHROCYTE [DISTWIDTH] IN BLOOD BY AUTOMATED COUNT: 42.5 %
ERYTHROCYTE [DISTWIDTH] IN BLOOD: 11.9 %
HCT VFR BLD AUTO: 57.1 %
HCT VFR BLD CALC: 57.1 %
HGB BLD-MCNC: 18.5 G/DL
HGB BLD-MCNC: 18.5 G/DL
MCH RBC QN AUTO: 31.1 PG
MCH RBC QN AUTO: 31.1 PG (ref 26–34)
MCHC RBC AUTO-ENTMCNC: 32.4 G/DL
MCHC RBC AUTO-ENTMCNC: 32.4 G/DL (ref 31–37)
MCV RBC AUTO: 96.1 FL
MCV RBC AUTO: 96.1 FL
PLATELET # BLD AUTO: 241 10(3)UL (ref 150–450)
PLATELET # BLD: 241 K/MCL
RBC # BLD AUTO: 5.94 X10(6)UL
RBC # BLD: 5.94 10*6/UL
THYROID STIMULATING HORMONE: 2.7 MCUNITS/ML
TSI SER-ACNC: 2.7 MIU/ML (ref 0.36–3.74)
WBC # BLD AUTO: 5 X10(3) UL (ref 4–11)
WBC # BLD: 5 K/MCL

## 2021-01-26 PROCEDURE — 3079F DIAST BP 80-89 MM HG: CPT | Performed by: HOSPITALIST

## 2021-01-26 PROCEDURE — 99232 SBSQ HOSP IP/OBS MODERATE 35: CPT | Performed by: HOSPITALIST

## 2021-01-26 PROCEDURE — 3074F SYST BP LT 130 MM HG: CPT | Performed by: HOSPITALIST

## 2021-01-26 RX ORDER — LISINOPRIL 2.5 MG/1
2.5 TABLET ORAL DAILY
Qty: 30 TABLET | Refills: 2 | Status: SHIPPED | OUTPATIENT
Start: 2021-01-27 | End: 2021-01-29 | Stop reason: ALTCHOICE

## 2021-01-26 RX ORDER — METOPROLOL SUCCINATE 25 MG/1
37.5 TABLET, EXTENDED RELEASE ORAL
Qty: 45 TABLET | Refills: 2 | Status: SHIPPED | OUTPATIENT
Start: 2021-01-27 | End: 2021-03-04

## 2021-01-26 RX ORDER — SPIRONOLACTONE 25 MG/1
12.5 TABLET ORAL DAILY
Qty: 30 TABLET | Refills: 2 | Status: SHIPPED | OUTPATIENT
Start: 2021-01-27

## 2021-01-26 RX ORDER — LISINOPRIL 2.5 MG/1
2.5 TABLET ORAL DAILY
Status: DISCONTINUED | OUTPATIENT
Start: 2021-01-27 | End: 2021-01-27

## 2021-01-26 NOTE — PAYOR COMM NOTE
--------------  1/26 CONTINUED STAY REVIEW    Payor: [employer]  Subscriber #:  [id number]  Authorization Number: [id number]     CARDS:     Acute on chronic congestive heart failure, systolic heart failure type [facility])  Patient with new dilated cardiomyopath Date Action Dose Route User    1/26/2021 0545 Given 25 mg Oral Elena Luo RN    1/25/2021 1707 Given 25 mg Oral Han Hooks RN      rivaroxaban Ramakrishna Gupta) tab 15 mg     Date Action Dose Route User    1/26/2021 0820 Given 15 mg Oral Gadiel Delong,

## 2021-01-26 NOTE — PROGRESS NOTES
[facility] [place] [facility]    Progress Note    Moses Palm Patient Status:  Inpatient    3/23/1970 MRN [id number]   Location [facility] 3W/ Attending Richelle Quintana MD   Hosp Day # 5 PCP None Pcp       Subjective:     Very good respo nodular opacity in the lingula-->needs short interval follow up CT    Heavy ETOH use  Quit recently at the onset of symptoms  No evidence of withdrawal  LFT slightly elevated, ALT~73    Dispo: pending, may need excuse from work note or short term disabilit

## 2021-01-26 NOTE — DISCHARGE PLANNING
Gina at [facility] stated that after prior authorization was processed, the copay for Corlanor is $30 for a 30 day supply. They asked if doctor can send another prescription for 20mg of Xarelto.   France Mcknight from [facility] asked if I could follow up on

## 2021-01-26 NOTE — PLAN OF CARE
IV Lasix discontinued. Plan to discharge with LifeVest. Bed locked and in lowest position at all times. Call light within reach. Pt aware of POC; no further concerns at this time. Will continue to monitor this shift.       Problem: Patient Centered Care  Go Progressing  Goal: Absence of cardiac arrhythmias or at baseline  Description: INTERVENTIONS:  - Continuous cardiac monitoring, monitor vital signs, obtain 12 lead EKG if indicated  - Evaluate effectiveness of antiarrhythmic and heart rate control medicati

## 2021-01-26 NOTE — PLAN OF CARE
AO4, room air. Up ad alexey. Orthostatics negative this morning. Expect discharge home with life vest. Cont to monitor this shift.     Problem: Patient Centered Care  Goal: Patient preferences are identified and integrated in the patient's plan of care  Carola INTERVENTIONS:  - Continuous cardiac monitoring, monitor vital signs, obtain 12 lead EKG if indicated  - Evaluate effectiveness of antiarrhythmic and heart rate control medications as ordered  - Initiate emergency measures for life threatening arrhythmias

## 2021-01-26 NOTE — PROGRESS NOTES
[facility]    Progress Note    Juan Captain Palm Patient Status:  Inpatient    3/23/1970 MRN [id number]   Location [facility] 3W/SW Attending Winnie Chandler MD   Hosp Day # 6 PCP None Pcp         Assessment and Plan:     Acut succinate  12.5 mg Oral Daily Beta Blocker   • [START ON 1/27/2021] Metoprolol Succinate ER  37.5 mg Oral Daily Beta Blocker   • ivabradine  2.5 mg Oral BID   • spironolactone  12.5 mg Oral Daily   • rivaroxaban  15 mg Oral BID with meals   • aspirin  81 m

## 2021-01-27 VITALS
RESPIRATION RATE: 16 BRPM | SYSTOLIC BLOOD PRESSURE: 108 MMHG | OXYGEN SATURATION: 97 % | WEIGHT: 177.19 LBS | DIASTOLIC BLOOD PRESSURE: 86 MMHG | BODY MASS INDEX: 23 KG/M2 | TEMPERATURE: 98 F | HEART RATE: 99 BPM

## 2021-01-27 PROCEDURE — 3074F SYST BP LT 130 MM HG: CPT | Performed by: HOSPITALIST

## 2021-01-27 PROCEDURE — 3079F DIAST BP 80-89 MM HG: CPT | Performed by: HOSPITALIST

## 2021-01-27 PROCEDURE — 99239 HOSP IP/OBS DSCHRG MGMT >30: CPT | Performed by: HOSPITALIST

## 2021-01-27 NOTE — PLAN OF CARE
Patient discharged home in stable condition. Discharge paperwork, follow up instructions, and medications reviewed. IVs removed.  Patient was fitted for life vest and is sent home with the life vest.    Problem: Patient Centered Care  Goal: Patient [facility] weights  Outcome: Adequate for Discharge  Goal: Absence of cardiac arrhythmias or at baseline  Description: INTERVENTIONS:  - Continuous cardiac monitoring, monitor vital signs, obtain 12 lead EKG if indicated  - Evaluate effectiveness of antiarrhythmic an straining and forceful nose blowing  Outcome: Adequate for Discharge

## 2021-01-27 NOTE — DISCHARGE SUMMARY
[facility] HOSPITALIST  DISCHARGE SUMMARY     Elissa Palm Patient Status:  Inpatient    3/23/1970 MRN [id number]   Location [facility] 3W/SW Attending No att. providers found   [facility] Day # 7 PCP None Pcp     DATE OF ADMISSION: 2021  DATE Praveena Evans therapy. Patient understands return to the emergency room for increased pain, fever, discharge, shortness of breath, chest pain, new neurologic symptoms, other concerning symptoms.     PHYSICAL EXAM:  Temp:  [97.2 °F (36.2 °C)-98 °F (36.7 °C)] 97.8 °F (3 package instructions: Days 1-21: 15 mg by mouth twice daily Days 22-30: 20 mg by mouth once daily   Quantity: 1 each  Refills: 0     Rivaroxaban 20 MG Tabs  Commonly known as: Kenyetta Patten  Start taking on: February 27, 2021  What changed:  [facility]    Lace+ Score: 67  59-90 High Risk  29-58 Medium Risk  0-28   Low Risk. TCM Follow-Up Recommendation:  LACE > 58:  High Risk of readmission after discharge from the hospital.

## 2021-01-27 NOTE — PAYOR COMM NOTE
--------------  1/26-27 CONTINUED STAY REVIEW    Payor: [employer]  Subscriber #:  [id number]  Authorization Number: [id number]       1/26    Pt was not dc'ed as anticipated. Cards still adjusting meds and monitoring response.   New script for Corjoseeor requ Action Dose Route User    1/26/2021 1122 Given 12.5 mg Oral Julieth Bhandari RN      rivaroxaban Rosmery Mackey) tab 15 mg     Date Action Dose Route User    1/27/2021 0758 Given 15 mg Oral Julieth Bhandari RN    1/26/2021 1737 Given 15 mg Oral Julieth Bhandari RN      s

## 2021-01-27 NOTE — PROGRESS NOTES
[facility]    Progress Note    Adelina Zurita Patient Status:  Inpatient    3/23/1970 MRN [id number]   Location [facility] 3W/SW Attending Nash Hagen MD   Hosp Day # 6 PCP None Pcp       Subjective:     Very good respo 1.5 cm nodular opacity in the lingula-->needs short interval follow up CT    Heavy ETOH use  Quit recently at the onset of symptoms  No evidence of withdrawal  LFT slightly elevated, ALT~73    Dispo: home once LifeVest arranged, may need excuse from work n

## 2021-01-27 NOTE — PLAN OF CARE
Plan to discharge this AM w/ LifeVest. Bed locked and in lowest position at all times. Call light within reach. Will continue to monitor this shift.      Problem: Patient Centered Care  Goal: Patient preferences are identified and integrated in the patient' baseline  Description: INTERVENTIONS:  - Continuous cardiac monitoring, monitor vital signs, obtain 12 lead EKG if indicated  - Evaluate effectiveness of antiarrhythmic and heart rate control medications as ordered  - Initiate emergency measures for life t

## 2021-01-27 NOTE — PROGRESS NOTES
[place] [facility] - [facility]    Progress Note    Kavon Gus Arpita Patient Status:  Inpatient    3/23/1970 MRN [id number]   Location [facility] 3W/SW Attending Checo Fischer MD   Hosp Day # 7 PCP None Pcp         Assessment and Plan:     Acut Beta Blocker   • ivabradine  5 mg Oral BID   • spironolactone  12.5 mg Oral Daily   • rivaroxaban  15 mg Oral BID with meals   • aspirin  81 mg Oral Daily   • Thiamine HCl  100 mg Oral Daily   • folic acid  1 mg Oral Daily   • atorvastatin  80 mg Oral Nig

## 2021-01-28 LAB — NUCLEAR IGG TITR SER IF: NEGATIVE {TITER}

## 2021-01-28 NOTE — PROGRESS NOTES
[address] Patient Status:  No patient class for patient encounter    3/23/1970 MRN [id number]   Location [facility] None Denise Gretel is a 45year old male who presents to clin 01/25/2021 06:29 AM     01/25/2021 06:29 AM    CO2 28.0 01/25/2021 06:29 AM    GLU 93 01/25/2021 06:29 AM    CA 9.5 01/25/2021 06:29 AM    ALB 3.0 (L) 01/21/2021 09:06 AM    ALKPHO 56 01/21/2021 09:06 AM    BILT 1.4 01/21/2021 09:06 AM    TP 6.0 (L) Cardiac index 1.7   AO sat 94%   PA sat 59%. US venous   CONCLUSION:    Negative for right or left lower extremity DVT.      Dictated by (CST): Checo Douglas MD on 1/20/2021 at 12:19 PM       Finalized by (CST): Checo Douglas MD on 1/20/2021 at 12:20 P Toprol, Ivarbadine           Devices: Lifevest Given NSVT on tele during hospitalization  -EF 10%  -LHC showed non obstructive CAD, LVEDP 28  -Diuresed with IV Lasix during hospital course for net neg 23# wt loss  -Diuretics: Spironolactone 12.5 mg daily ph: [phone]    32–64 ounces of fluid daily    We recommend following a diet which consist of less than 2000 mg salt/sodium daily ( 1 teaspoon).  Common high sodium foods include frozen dinners, soups (not homemade), some cereal, vegetable juice, canned

## 2021-01-29 ENCOUNTER — OFFICE VISIT (OUTPATIENT)
Dept: CARDIOLOGY CLINIC | Facility: HOSPITAL | Age: 51
End: 2021-01-29
Attending: NURSE PRACTITIONER
Payer: COMMERCIAL

## 2021-01-29 VITALS
HEART RATE: 98 BPM | WEIGHT: 182.81 LBS | OXYGEN SATURATION: 99 % | DIASTOLIC BLOOD PRESSURE: 68 MMHG | SYSTOLIC BLOOD PRESSURE: 101 MMHG | BODY MASS INDEX: 24 KG/M2

## 2021-01-29 DIAGNOSIS — I26.93 SINGLE SUBSEGMENTAL PULMONARY EMBOLISM WITHOUT ACUTE COR PULMONALE (HCC): ICD-10-CM

## 2021-01-29 DIAGNOSIS — I42.8 NICM (NONISCHEMIC CARDIOMYOPATHY) (HCC): ICD-10-CM

## 2021-01-29 DIAGNOSIS — F10.10 ETOH ABUSE: ICD-10-CM

## 2021-01-29 DIAGNOSIS — I50.23 ACUTE ON CHRONIC HFREF (HEART FAILURE WITH REDUCED EJECTION FRACTION) (HCC): Primary | ICD-10-CM

## 2021-01-29 DIAGNOSIS — I50.9 HEART FAILURE, UNSPECIFIED (HCC): ICD-10-CM

## 2021-01-29 LAB
ANION GAP SERPL CALC-SCNC: 5 MMOL/L (ref 0–18)
BUN BLD-MCNC: 22 MG/DL (ref 7–18)
BUN/CREAT SERPL: 17.7 (ref 10–20)
CALCIUM BLD-MCNC: 9.3 MG/DL (ref 8.5–10.1)
CHLORIDE SERPL-SCNC: 106 MMOL/L (ref 98–112)
CO2 SERPL-SCNC: 27 MMOL/L (ref 21–32)
CREAT BLD-MCNC: 1.24 MG/DL
GLUCOSE BLD-MCNC: 83 MG/DL (ref 70–99)
NT-PROBNP SERPL-MCNC: 2444 PG/ML (ref ?–125)
OSMOLALITY SERPL CALC.SUM OF ELEC: 288 MOSM/KG (ref 275–295)
PATIENT FASTING Y/N/NP: NO
POTASSIUM SERPL-SCNC: 4.2 MMOL/L (ref 3.5–5.1)
SODIUM SERPL-SCNC: 138 MMOL/L (ref 136–145)

## 2021-01-29 PROCEDURE — 80048 BASIC METABOLIC PNL TOTAL CA: CPT | Performed by: NURSE PRACTITIONER

## 2021-01-29 PROCEDURE — 93005 ELECTROCARDIOGRAM TRACING: CPT

## 2021-01-29 PROCEDURE — 99202 OFFICE O/P NEW SF 15 MIN: CPT | Performed by: NURSE PRACTITIONER

## 2021-01-29 PROCEDURE — 99205 OFFICE O/P NEW HI 60 MIN: CPT | Performed by: NURSE PRACTITIONER

## 2021-01-29 PROCEDURE — 93010 ELECTROCARDIOGRAM REPORT: CPT | Performed by: NURSE PRACTITIONER

## 2021-01-29 PROCEDURE — 83880 ASSAY OF NATRIURETIC PEPTIDE: CPT | Performed by: NURSE PRACTITIONER

## 2021-01-29 RX ORDER — SACUBITRIL AND VALSARTAN 24; 26 MG/1; MG/1
1 TABLET, FILM COATED ORAL 2 TIMES DAILY
Qty: 60 TABLET | Refills: 0 | Status: SHIPPED | OUTPATIENT
Start: 2021-01-29 | End: 2021-02-25

## 2021-01-29 NOTE — PATIENT INSTRUCTIONS
Stop Lisinopril (do not throw away bottle)    Start Entresto on Monday, 2/1/21. Please take 1 tablet twice daily    Please start taking your blood pressure daily.  If your blood pressure goes below 90/50 and/or you experience lightheadedness, dizziness or f

## 2021-01-29 NOTE — PAYOR COMM NOTE
--------------  DISCHARGE REVIEW    Payor: [employer]  Subscriber #:  [id number]  Authorization Number: [id number]    Admit date: 1/20/21  Admit time:  [facility]  Discharge Date: 1/27/2021  1:23 PM     Admitting Physician: Sudha Suarez MD  Attending Physici of some personal necessities and then he came back to the emergency department later today. COVID testing was negative. The patient will be started on IV Lasix and heparin and he will be admitted to the hospital for further management.     HOSPITAL COURSE as: Toprol XL      Take 1.5 tablets (37.5 mg total) by mouth Daily Beta Blocker. Quantity: 45 tablet  Refills: 2     spironolactone 25 MG Tabs  Commonly known as: ALDACTONE      Take 0.5 tablets (12.5 mg total) by mouth daily.    Quantity: 30 tablet  Refi appointment as soon as possible for a visit in 1 week  For a Post Discharge Followup after you choose a primary care physician     Rupinder Huerta, HATTIE   [address] [place] [address], [address] [place] [address]  [phone]  Go on 2/1/2021  Appoint

## 2021-02-01 ENCOUNTER — APPOINTMENT (OUTPATIENT)
Dept: CARDIOLOGY | Age: 51
End: 2021-02-01

## 2021-02-01 ENCOUNTER — TELEPHONE (OUTPATIENT)
Dept: CARDIOLOGY | Age: 51
End: 2021-02-01

## 2021-02-04 NOTE — PROGRESS NOTES
[address] Patient Status:  No patient class for patient encounter    3/23/1970 MRN [id number]   [facility] 45[facility] who pres 01/29/2021 11:01 AM    BUN 22 (H) 01/29/2021 11:01 AM     01/29/2021 11:01 AM    K 4.2 01/29/2021 11:01 AM     01/29/2021 11:01 AM    CO2 27.0 01/29/2021 11:01 AM    GLU 83 01/29/2021 11:01 AM    CA 9.3 01/29/2021 11:01 AM    ALB 3.0 (L) 01/21/ 40/22   Wedge mean 20 mmHg   Right ventricular pressure 41/8   Mean RA at 11   Cardiac output 3.6 L/min   Cardiac index 1.7   AO sat 94%   PA sat 59%. US venous   CONCLUSION:    Negative for right or left lower extremity DVT.      Dictated by (CST): Jose Gaines receptive.       Assessment:  HFrEF  -new NICM  ACC/AHA Stage C; NYHA III  GDMT/ Heart Failure Medications: Entrestol, Spironolactone, Toprol, Ivarbadine           Devices: Lifevest Given NSVT on tele during hospitalization; Zoll reviewed, no events  -EF 10 [phone]    32–64 ounces of fluid daily    We recommend following a diet which consist of less than 2000 mg salt/sodium daily ( 1 teaspoon).  Common high sodium foods include frozen dinners, soups (not homemade), some cereal, vegetable juice, canned veg

## 2021-02-05 ENCOUNTER — TELEPHONE (OUTPATIENT)
Dept: FAMILY MEDICINE CLINIC | Facility: CLINIC | Age: 51
End: 2021-02-05

## 2021-02-05 ENCOUNTER — OFFICE VISIT (OUTPATIENT)
Dept: FAMILY MEDICINE CLINIC | Facility: CLINIC | Age: 51
End: 2021-02-05
Payer: COMMERCIAL

## 2021-02-05 ENCOUNTER — OFFICE VISIT (OUTPATIENT)
Dept: CARDIOLOGY CLINIC | Facility: HOSPITAL | Age: 51
End: 2021-02-05
Attending: NURSE PRACTITIONER
Payer: COMMERCIAL

## 2021-02-05 VITALS
SYSTOLIC BLOOD PRESSURE: 100 MMHG | HEART RATE: 89 BPM | BODY MASS INDEX: 24 KG/M2 | OXYGEN SATURATION: 93 % | WEIGHT: 184 LBS | DIASTOLIC BLOOD PRESSURE: 68 MMHG

## 2021-02-05 VITALS
HEART RATE: 88 BPM | SYSTOLIC BLOOD PRESSURE: 82 MMHG | BODY MASS INDEX: 24.65 KG/M2 | HEIGHT: 73 IN | DIASTOLIC BLOOD PRESSURE: 51 MMHG | WEIGHT: 186 LBS

## 2021-02-05 DIAGNOSIS — E78.01 FAMILIAL HYPERCHOLESTEROLEMIA: ICD-10-CM

## 2021-02-05 DIAGNOSIS — F10.10 ETOH ABUSE: ICD-10-CM

## 2021-02-05 DIAGNOSIS — I42.8 NICM (NONISCHEMIC CARDIOMYOPATHY) (HCC): Primary | ICD-10-CM

## 2021-02-05 DIAGNOSIS — I42.8 NICM (NONISCHEMIC CARDIOMYOPATHY) (HCC): ICD-10-CM

## 2021-02-05 DIAGNOSIS — I50.9 HEART FAILURE, UNSPECIFIED (HCC): ICD-10-CM

## 2021-02-05 DIAGNOSIS — I50.23 ACUTE ON CHRONIC HFREF (HEART FAILURE WITH REDUCED EJECTION FRACTION) (HCC): Primary | ICD-10-CM

## 2021-02-05 DIAGNOSIS — Z12.11 SCREEN FOR COLON CANCER: ICD-10-CM

## 2021-02-05 DIAGNOSIS — J94.8 PLEURAL CALCIFICATION: ICD-10-CM

## 2021-02-05 LAB
ANION GAP SERPL CALC-SCNC: 4 MMOL/L
ANION GAP SERPL CALC-SCNC: 4 MMOL/L (ref 0–18)
BUN BLD-MCNC: 17 MG/DL (ref 7–18)
BUN SERPL-MCNC: 17 MG/DL
BUN/CREAT SERPL: 14
BUN/CREAT SERPL: 14 (ref 10–20)
CALCIUM BLD-MCNC: 9.4 MG/DL (ref 8.5–10.1)
CALCIUM SERPL-MCNC: 9.4 MG/DL
CHLORIDE SERPL-SCNC: 109 MMOL/L
CHLORIDE SERPL-SCNC: 109 MMOL/L (ref 98–112)
CO2 SERPL-SCNC: 26 MMOL/L
CO2 SERPL-SCNC: 26 MMOL/L (ref 21–32)
CREAT BLD-MCNC: 1.21 MG/DL
CREAT SERPL-MCNC: 1.21 MG/DL
GLUCOSE BLD-MCNC: 102 MG/DL (ref 70–99)
GLUCOSE SERPL-MCNC: 102 MG/DL
LENGTH OF FAST TIME PATIENT: NO H
OSMOLALITY SERPL CALC.SUM OF ELEC: 290 MOSM/KG (ref 275–295)
PATIENT FASTING Y/N/NP: NO
POTASSIUM SERPL-SCNC: 4.3 MMOL/L
POTASSIUM SERPL-SCNC: 4.3 MMOL/L (ref 3.5–5.1)
SODIUM SERPL-SCNC: 139 MMOL/L
SODIUM SERPL-SCNC: 139 MMOL/L (ref 136–145)

## 2021-02-05 PROCEDURE — 3008F BODY MASS INDEX DOCD: CPT | Performed by: FAMILY MEDICINE

## 2021-02-05 PROCEDURE — 3078F DIAST BP <80 MM HG: CPT | Performed by: FAMILY MEDICINE

## 2021-02-05 PROCEDURE — 99214 OFFICE O/P EST MOD 30 MIN: CPT | Performed by: FAMILY MEDICINE

## 2021-02-05 PROCEDURE — 36415 COLL VENOUS BLD VENIPUNCTURE: CPT | Performed by: NURSE PRACTITIONER

## 2021-02-05 PROCEDURE — 99212 OFFICE O/P EST SF 10 MIN: CPT | Performed by: NURSE PRACTITIONER

## 2021-02-05 PROCEDURE — 90732 PPSV23 VACC 2 YRS+ SUBQ/IM: CPT | Performed by: FAMILY MEDICINE

## 2021-02-05 PROCEDURE — 3074F SYST BP LT 130 MM HG: CPT | Performed by: FAMILY MEDICINE

## 2021-02-05 PROCEDURE — 90471 IMMUNIZATION ADMIN: CPT | Performed by: FAMILY MEDICINE

## 2021-02-05 PROCEDURE — 99214 OFFICE O/P EST MOD 30 MIN: CPT | Performed by: NURSE PRACTITIONER

## 2021-02-05 PROCEDURE — 80048 BASIC METABOLIC PNL TOTAL CA: CPT | Performed by: NURSE PRACTITIONER

## 2021-02-05 NOTE — PATIENT INSTRUCTIONS
Continue Entresto twice daily    Continue all other medications    Return to [address] [place] [address] on 3/42/5/21/21    Schedule a follow up appointment with Dr. Milana Chou.   His phone number is: [phone]    Please call the [address] [place] [address] if you not

## 2021-02-05 NOTE — PROGRESS NOTES
Carmel Najjar is a 45year old male.   HPI:   Patient here following up after recent admission, patient was diagnosed with acute on chronic CHF, patient has been compliant with water restrictions, compliant with prescriptions as provided, he denies any s nursing note reviewed. Constitutional:       General: He is not in acute distress. Appearance: Normal appearance. He is not ill-appearing. HENT:      Head: Normocephalic and atraumatic. Neck:      Musculoskeletal: Normal range of motion.    Cardio

## 2021-02-05 NOTE — TELEPHONE ENCOUNTER
Fax received at [facility] for FMLA forms to be filled out. Forms emailed to the forms dept and placed in [place] ROI bin.

## 2021-02-09 RX ORDER — METOPROLOL SUCCINATE 25 MG/1
37.5 TABLET, EXTENDED RELEASE ORAL DAILY
COMMUNITY
Start: 2021-01-27 | End: 2021-02-18 | Stop reason: SDUPTHER

## 2021-02-09 RX ORDER — SACUBITRIL AND VALSARTAN 24; 26 MG/1; MG/1
2 TABLET, FILM COATED ORAL 2 TIMES DAILY
COMMUNITY
Start: 2021-01-29 | End: 2021-04-27 | Stop reason: DRUGHIGH

## 2021-02-09 RX ORDER — LISINOPRIL 2.5 MG/1
1 TABLET ORAL DAILY
COMMUNITY
Start: 2021-01-26 | End: 2021-02-18 | Stop reason: ALTCHOICE

## 2021-02-09 RX ORDER — ATORVASTATIN CALCIUM 10 MG/1
1 TABLET, FILM COATED ORAL DAILY
COMMUNITY
Start: 2016-04-12 | End: 2021-02-18

## 2021-02-09 RX ORDER — SPIRONOLACTONE 25 MG/1
12.5 TABLET ORAL DAILY
COMMUNITY
Start: 2021-01-27 | End: 2021-04-20 | Stop reason: SDUPTHER

## 2021-02-09 NOTE — TELEPHONE ENCOUNTER
Dr. Abhinav Hamilton,     Please sign off on form: FMLA  1/20/21 -through 1-6 weeks pending cardiologist appt   -Highlight the patient and hit \"Chart\" button.   -In Chart Review, w/in the Encounter tab - click 1 time on the Telephone call encounter for 2/5/

## 2021-02-10 ENCOUNTER — LAB ENCOUNTER (OUTPATIENT)
Dept: LAB | Age: 51
End: 2021-02-10
Attending: FAMILY MEDICINE
Payer: COMMERCIAL

## 2021-02-10 DIAGNOSIS — Z12.11 SCREEN FOR COLON CANCER: ICD-10-CM

## 2021-02-10 PROCEDURE — 82274 ASSAY TEST FOR BLOOD FECAL: CPT

## 2021-02-18 ENCOUNTER — TELEPHONE (OUTPATIENT)
Dept: CARDIOLOGY | Age: 51
End: 2021-02-18

## 2021-02-18 ENCOUNTER — OFFICE VISIT (OUTPATIENT)
Dept: CARDIOLOGY | Age: 51
End: 2021-02-18

## 2021-02-18 VITALS
HEIGHT: 73 IN | SYSTOLIC BLOOD PRESSURE: 104 MMHG | WEIGHT: 182 LBS | OXYGEN SATURATION: 100 % | HEART RATE: 105 BPM | BODY MASS INDEX: 24.12 KG/M2 | DIASTOLIC BLOOD PRESSURE: 80 MMHG

## 2021-02-18 DIAGNOSIS — E78.01 FAMILIAL HYPERCHOLESTEROLEMIA: ICD-10-CM

## 2021-02-18 DIAGNOSIS — I42.8 NICM (NONISCHEMIC CARDIOMYOPATHY) (CMD): Primary | ICD-10-CM

## 2021-02-18 PROCEDURE — 99214 OFFICE O/P EST MOD 30 MIN: CPT | Performed by: INTERNAL MEDICINE

## 2021-02-18 RX ORDER — METOPROLOL SUCCINATE 50 MG/1
50 TABLET, EXTENDED RELEASE ORAL DAILY
Qty: 30 TABLET | Refills: 5 | Status: SHIPPED | OUTPATIENT
Start: 2021-02-18 | End: 2021-04-20

## 2021-02-18 ASSESSMENT — PATIENT HEALTH QUESTIONNAIRE - PHQ9
SUM OF ALL RESPONSES TO PHQ9 QUESTIONS 1 AND 2: 0
SUM OF ALL RESPONSES TO PHQ9 QUESTIONS 1 AND 2: 0
CLINICAL INTERPRETATION OF PHQ9 SCORE: NO FURTHER SCREENING NEEDED
2. FEELING DOWN, DEPRESSED OR HOPELESS: NOT AT ALL
1. LITTLE INTEREST OR PLEASURE IN DOING THINGS: NOT AT ALL
CLINICAL INTERPRETATION OF PHQ2 SCORE: NO FURTHER SCREENING NEEDED

## 2021-02-25 ENCOUNTER — TELEPHONE (OUTPATIENT)
Dept: CARDIOLOGY | Age: 51
End: 2021-02-25

## 2021-02-25 ENCOUNTER — HOSPITAL ENCOUNTER (OUTPATIENT)
Dept: CT IMAGING | Age: 51
Discharge: HOME | End: 2021-02-25
Attending: FAMILY MEDICINE
Payer: COMMERCIAL

## 2021-02-25 DIAGNOSIS — J94.8 PLEURAL CALCIFICATION: ICD-10-CM

## 2021-02-25 PROCEDURE — 71260 CT THORAX DX C+: CPT | Performed by: FAMILY MEDICINE

## 2021-02-25 RX ORDER — SACUBITRIL AND VALSARTAN 24; 26 MG/1; MG/1
1 TABLET, FILM COATED ORAL 2 TIMES DAILY
Qty: 60 TABLET | Refills: 1 | Status: SHIPPED | OUTPATIENT
Start: 2021-02-25

## 2021-02-25 RX ORDER — IVABRADINE 5 MG/1
TABLET, FILM COATED ORAL
Qty: 60 TABLET | Refills: 0 | OUTPATIENT
Start: 2021-02-25

## 2021-02-25 NOTE — TELEPHONE ENCOUNTER
Patient not seen at [facility]. Seen at [facility]. Refill denied and message sent to pharmacy.

## 2021-02-26 ENCOUNTER — TELEPHONE (OUTPATIENT)
Dept: CARDIOLOGY | Age: 51
End: 2021-02-26

## 2021-03-03 NOTE — PROGRESS NOTES
[address] Patient Status:  No patient class for patient encounter    3/23/1970 MRN [id number]   Location [facility]  Dr. 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 There are small scattered bilateral pulmonary nodules including right middle lobe nodules that measure up to 4 mm, which   appear new or more conspicuous since prior.   Although th Spironolactone 12.5 mg daily  -Euvolemic, wt stable  -Renal function stable BUN/Cr 22/1.24-->17/1.22-> 18/0.87, K  4.3-> 4.2  -pBNP 8100-->2444  -Continue Entresto 24-26 mg twice daily.  Will likely be unable to up titrate as BP limited  -monitor daily benjamín sausage, esquivel, pepperoni, soy sauce, pre-packaged rice or potatoes. Please remember to read nutrition labels for sodium content. Avoid the use of NSAID including but not limited to Ibuprofen, Advil and Aleve    www.healthyeating. nhlbi.nih.gov      I s

## 2021-03-04 ENCOUNTER — OFFICE VISIT (OUTPATIENT)
Dept: CARDIOLOGY CLINIC | Facility: HOSPITAL | Age: 51
End: 2021-03-04
Attending: NURSE PRACTITIONER
Payer: COMMERCIAL

## 2021-03-04 VITALS
WEIGHT: 181 LBS | BODY MASS INDEX: 24 KG/M2 | OXYGEN SATURATION: 99 % | HEART RATE: 66 BPM | DIASTOLIC BLOOD PRESSURE: 70 MMHG | SYSTOLIC BLOOD PRESSURE: 119 MMHG

## 2021-03-04 DIAGNOSIS — I50.9 HEART FAILURE, UNSPECIFIED (HCC): ICD-10-CM

## 2021-03-04 DIAGNOSIS — F10.10 ETOH ABUSE: ICD-10-CM

## 2021-03-04 DIAGNOSIS — I26.93 SINGLE SUBSEGMENTAL PULMONARY EMBOLISM WITHOUT ACUTE COR PULMONALE (HCC): ICD-10-CM

## 2021-03-04 DIAGNOSIS — I42.8 NICM (NONISCHEMIC CARDIOMYOPATHY) (HCC): Primary | ICD-10-CM

## 2021-03-04 PROCEDURE — 99212 OFFICE O/P EST SF 10 MIN: CPT | Performed by: NURSE PRACTITIONER

## 2021-03-04 PROCEDURE — 99214 OFFICE O/P EST MOD 30 MIN: CPT | Performed by: NURSE PRACTITIONER

## 2021-03-04 RX ORDER — METOPROLOL SUCCINATE 50 MG/1
75 TABLET, EXTENDED RELEASE ORAL
COMMUNITY
Start: 2021-03-04

## 2021-03-04 NOTE — PATIENT INSTRUCTIONS
Continue Entresto twice daily     Increase metoprolol succinate to 75 mg daily    Continue all other medications    Return to [address] [place] [address] on 3/18    Follow up appointment with Dr. Dimitris Roberts on 4/1.

## 2021-03-05 ENCOUNTER — PATIENT MESSAGE (OUTPATIENT)
Dept: CARDIOLOGY CLINIC | Facility: HOSPITAL | Age: 51
End: 2021-03-05

## 2021-03-05 DIAGNOSIS — Z23 NEED FOR VACCINATION: ICD-10-CM

## 2021-03-05 NOTE — TELEPHONE ENCOUNTER
Pt informed this practitioner he has not been taking corlanor since it ran out after the 30 days. Discussed with Dr. Elvia Castellanos. Will remain off corlanor and titrate BB as tolerated.

## 2021-03-08 ENCOUNTER — PATIENT MESSAGE (OUTPATIENT)
Dept: FAMILY MEDICINE CLINIC | Facility: CLINIC | Age: 51
End: 2021-03-08

## 2021-03-09 NOTE — TELEPHONE ENCOUNTER
nanciet message sent to pt, await reply. To reception staff, pls call pt for physical appt.          Future Appointments   Date Time Provider Paulino Maki   3/18/2021  8:00 AM [facility]

## 2021-03-09 NOTE — TELEPHONE ENCOUNTER
1st attempt/left voice mail message for pt to call back. When the patient returns the call please assist in scheduling a physical per the request below.

## 2021-03-09 NOTE — TELEPHONE ENCOUNTER
From: India Palm  To: Winnie Aguirre. Sayra Boudreaux MD  Sent: 3/8/2021 5:46 PM CST  Subject: Visit Follow-up Question    Hi Dr. Barbra Morgan-    I got the Blinda Hawa vaccine on 12/30/2021. I was in the hospital when due for the booster.  I got the booster 02/2

## 2021-03-18 ENCOUNTER — OFFICE VISIT (OUTPATIENT)
Dept: CARDIOLOGY CLINIC | Facility: HOSPITAL | Age: 51
End: 2021-03-18
Attending: NURSE PRACTITIONER
Payer: COMMERCIAL

## 2021-03-18 VITALS
HEART RATE: 75 BPM | WEIGHT: 181 LBS | BODY MASS INDEX: 24 KG/M2 | DIASTOLIC BLOOD PRESSURE: 66 MMHG | SYSTOLIC BLOOD PRESSURE: 99 MMHG | OXYGEN SATURATION: 99 %

## 2021-03-18 DIAGNOSIS — F10.10 ETOH ABUSE: ICD-10-CM

## 2021-03-18 DIAGNOSIS — I50.9 HEART FAILURE, UNSPECIFIED (HCC): ICD-10-CM

## 2021-03-18 DIAGNOSIS — I50.22 CHRONIC HFREF (HEART FAILURE WITH REDUCED EJECTION FRACTION) (HCC): Primary | ICD-10-CM

## 2021-03-18 NOTE — PATIENT INSTRUCTIONS
Continue Entresto twice daily     Continue metoprolol succinate to 75 mg daily    Continue all other medications    Return to [address] [place] [address] as needed or directed    Follow up appointment with Dr. Teri Lambert on 4/1.       Please call the [facility]

## 2021-03-18 NOTE — PROGRESS NOTES
[address] Patient Status:  No patient class for patient encounter    3/23/1970 MRN [id number]   Location [facility]  Dr. 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 There are small scattered bilateral pulmonary nodules including right middle lobe nodules that measure up to 4 mm, which   appear new or more conspicuous since prior.   Although these nodules are most likely benign BUN/Cr 18/0.87--> 14/1.05 (from outside lab 3/16/21), K  4.0  -Continue Entresto 24-26 mg twice daily.  Will likely be unable to up titrate as BP limited  -monitor daily weights and BP  - metoprolol succinate 75 mg daily, HR improved;   -Scheduled for CMR a Aleve    www.Sanswireeating. nhlbi.nih.gov      I spent 50 minutes with this patient providing counseling, coordination of care and education related specifically to heart failure.     Seen with HATTIE Quach student  Harry Mandel NP  3/18/21

## 2021-04-01 ENCOUNTER — OFFICE VISIT (OUTPATIENT)
Dept: CARDIOLOGY | Age: 51
End: 2021-04-01

## 2021-04-01 VITALS
WEIGHT: 181 LBS | DIASTOLIC BLOOD PRESSURE: 74 MMHG | OXYGEN SATURATION: 98 % | SYSTOLIC BLOOD PRESSURE: 112 MMHG | HEIGHT: 73 IN | HEART RATE: 48 BPM | BODY MASS INDEX: 23.99 KG/M2

## 2021-04-01 DIAGNOSIS — I50.9 ACUTE ON CHRONIC CONGESTIVE HEART FAILURE, UNSPECIFIED HEART FAILURE TYPE (CMD): ICD-10-CM

## 2021-04-01 DIAGNOSIS — E78.01 FAMILIAL HYPERCHOLESTEROLEMIA: ICD-10-CM

## 2021-04-01 DIAGNOSIS — I42.8 NICM (NONISCHEMIC CARDIOMYOPATHY) (CMD): Primary | ICD-10-CM

## 2021-04-01 PROCEDURE — 99214 OFFICE O/P EST MOD 30 MIN: CPT | Performed by: INTERNAL MEDICINE

## 2021-04-01 ASSESSMENT — PATIENT HEALTH QUESTIONNAIRE - PHQ9
2. FEELING DOWN, DEPRESSED OR HOPELESS: NOT AT ALL
CLINICAL INTERPRETATION OF PHQ9 SCORE: NO FURTHER SCREENING NEEDED
1. LITTLE INTEREST OR PLEASURE IN DOING THINGS: NOT AT ALL
SUM OF ALL RESPONSES TO PHQ9 QUESTIONS 1 AND 2: 0
SUM OF ALL RESPONSES TO PHQ9 QUESTIONS 1 AND 2: 0
CLINICAL INTERPRETATION OF PHQ2 SCORE: NO FURTHER SCREENING NEEDED

## 2021-04-05 ENCOUNTER — OFFICE VISIT (OUTPATIENT)
Dept: FAMILY MEDICINE CLINIC | Facility: CLINIC | Age: 51
End: 2021-04-05
Payer: COMMERCIAL

## 2021-04-05 VITALS
BODY MASS INDEX: 24.25 KG/M2 | HEART RATE: 82 BPM | SYSTOLIC BLOOD PRESSURE: 96 MMHG | HEIGHT: 73 IN | DIASTOLIC BLOOD PRESSURE: 57 MMHG | WEIGHT: 183 LBS

## 2021-04-05 DIAGNOSIS — Z12.11 COLON CANCER SCREENING: ICD-10-CM

## 2021-04-05 DIAGNOSIS — I50.22 CHRONIC HFREF (HEART FAILURE WITH REDUCED EJECTION FRACTION) (HCC): ICD-10-CM

## 2021-04-05 DIAGNOSIS — R73.09 ELEVATED GLUCOSE: ICD-10-CM

## 2021-04-05 DIAGNOSIS — Z86.711 HISTORY OF PULMONARY EMBOLUS (PE): ICD-10-CM

## 2021-04-05 DIAGNOSIS — Z12.5 PROSTATE CANCER SCREENING: ICD-10-CM

## 2021-04-05 DIAGNOSIS — Z00.00 WELLNESS EXAMINATION: Primary | ICD-10-CM

## 2021-04-05 DIAGNOSIS — R91.1 LUNG NODULE: ICD-10-CM

## 2021-04-05 PROCEDURE — 99396 PREV VISIT EST AGE 40-64: CPT | Performed by: FAMILY MEDICINE

## 2021-04-05 PROCEDURE — 3078F DIAST BP <80 MM HG: CPT | Performed by: FAMILY MEDICINE

## 2021-04-05 PROCEDURE — 3008F BODY MASS INDEX DOCD: CPT | Performed by: FAMILY MEDICINE

## 2021-04-05 PROCEDURE — 3074F SYST BP LT 130 MM HG: CPT | Performed by: FAMILY MEDICINE

## 2021-04-05 RX ORDER — METOPROLOL SUCCINATE 25 MG/1
37.5 TABLET, EXTENDED RELEASE ORAL DAILY
COMMUNITY
Start: 2021-03-12 | End: 2021-04-05

## 2021-04-05 NOTE — PROGRESS NOTES
HPI:   Chidi Sequeira is a 45year old male who presents for an Annual Health Visit.    Patient has no new complaints reported at this time, he has increase his exercise slowly, has been walking around 20 minutes, was recommended to increase as toljulienne Sitting, Cuff Size: adult)   Pulse 82   Ht 6' 1\" (1.854 m)   Wt 183 lb (83 kg)   BMI 24.14 kg/m²    Wt Readings from Last 6 Encounters:  04/05/21 : 183 lb (83 kg)  03/18/21 : 181 lb (82.1 kg)  03/04/21 : 181 lb (82.1 kg)  02/05/21 : 184 lb (83.5 kg)  02/0 this was also discussed with patient, lifestyle modification clinic encouraged, he has been in a low sodium diet with focus on intake of fresh food and has been able to maintain normal stable weight, will continue monitoring, referral to GI provided as pat DNA test, every 3 years  If you choose a test other than a colonoscopy and have an abnormal test result, you will need to follow-up with a colonoscopy. Screening recommendations advice vary varies among expert groups.  Talk with your healthcare provider abo of testing with digital rectal exam (BARBARA) and prostate-specific antigen (PSA) screening At routine exams if you decide to be tested.    Syphilis Men at increased risk for infection At routine exams; talk with your healthcare provider   Tuberculosis Men at i pneumococcal bacteria)  PPSV23: 1 to 2 doses through age 59, (protects against 23 types of pneumococcal bacteria)   Tetanus/diphtheria/pertussis (Td/Tdap) booster All men in this age group Td every 10 years, or a 1-time dose of Tdap instead of a Td booster educational content on 5/1/2019  © 0860-2473 [employer]. All rights reserved. This information is not intended as a substitute for professional medical care. Always follow your healthcare professional's instructions.             The patient i

## 2021-04-15 ENCOUNTER — HOSPITAL ENCOUNTER (OUTPATIENT)
Dept: MRI IMAGING | Age: 51
Discharge: HOME | End: 2021-04-15
Attending: INTERNAL MEDICINE

## 2021-04-15 DIAGNOSIS — E78.01 FAMILIAL HYPERCHOLESTEROLEMIA: ICD-10-CM

## 2021-04-15 DIAGNOSIS — I42.8 NICM (NONISCHEMIC CARDIOMYOPATHY) (CMD): ICD-10-CM

## 2021-04-15 PROCEDURE — 75561 CARDIAC MRI FOR MORPH W/DYE: CPT | Performed by: INTERNAL MEDICINE

## 2021-04-15 PROCEDURE — A9585 GADOBUTROL INJECTION: HCPCS | Performed by: INTERNAL MEDICINE

## 2021-04-15 PROCEDURE — 10002805 HB CONTRAST AGENT: Performed by: INTERNAL MEDICINE

## 2021-04-15 PROCEDURE — 75561 CARDIAC MRI FOR MORPH W/DYE: CPT

## 2021-04-15 RX ORDER — GADOBUTROL 604.72 MG/ML
20 INJECTION INTRAVENOUS ONCE
Status: COMPLETED | OUTPATIENT
Start: 2021-04-15 | End: 2021-04-15

## 2021-04-15 RX ADMIN — GADOBUTROL 20 ML: 604.72 INJECTION INTRAVENOUS at 11:32

## 2021-04-16 ENCOUNTER — TELEPHONE (OUTPATIENT)
Dept: CARDIOLOGY | Age: 51
End: 2021-04-16

## 2021-04-20 ENCOUNTER — OFFICE VISIT (OUTPATIENT)
Dept: CARDIOLOGY | Age: 51
End: 2021-04-20

## 2021-04-20 ENCOUNTER — E-ADVICE (OUTPATIENT)
Dept: CARDIOLOGY | Age: 51
End: 2021-04-20

## 2021-04-20 ENCOUNTER — TELEPHONE (OUTPATIENT)
Dept: CARDIOLOGY | Age: 51
End: 2021-04-20

## 2021-04-20 VITALS
WEIGHT: 182 LBS | HEART RATE: 91 BPM | DIASTOLIC BLOOD PRESSURE: 70 MMHG | BODY MASS INDEX: 24.12 KG/M2 | SYSTOLIC BLOOD PRESSURE: 118 MMHG | HEIGHT: 73 IN | OXYGEN SATURATION: 96 %

## 2021-04-20 DIAGNOSIS — I42.8 NICM (NONISCHEMIC CARDIOMYOPATHY) (CMD): Primary | ICD-10-CM

## 2021-04-20 PROCEDURE — 99214 OFFICE O/P EST MOD 30 MIN: CPT | Performed by: NURSE PRACTITIONER

## 2021-04-20 RX ORDER — METOPROLOL SUCCINATE 50 MG/1
75 TABLET, EXTENDED RELEASE ORAL DAILY
Status: SHIPPED | COMMUNITY
Start: 2021-04-20 | End: 2021-06-17 | Stop reason: DRUGHIGH

## 2021-04-20 RX ORDER — SPIRONOLACTONE 25 MG/1
12.5 TABLET ORAL DAILY
Qty: 45 TABLET | Refills: 3 | Status: SHIPPED | OUTPATIENT
Start: 2021-04-20

## 2021-04-20 ASSESSMENT — PATIENT HEALTH QUESTIONNAIRE - PHQ9
CLINICAL INTERPRETATION OF PHQ9 SCORE: NO FURTHER SCREENING NEEDED
2. FEELING DOWN, DEPRESSED OR HOPELESS: NOT AT ALL
SUM OF ALL RESPONSES TO PHQ9 QUESTIONS 1 AND 2: 0
1. LITTLE INTEREST OR PLEASURE IN DOING THINGS: NOT AT ALL
CLINICAL INTERPRETATION OF PHQ2 SCORE: NO FURTHER SCREENING NEEDED
SUM OF ALL RESPONSES TO PHQ9 QUESTIONS 1 AND 2: 0

## 2021-04-25 ENCOUNTER — E-ADVICE (OUTPATIENT)
Dept: CARDIOLOGY | Age: 51
End: 2021-04-25

## 2021-04-26 ENCOUNTER — LAB ENCOUNTER (OUTPATIENT)
Dept: LAB | Age: 51
End: 2021-04-26
Attending: INTERNAL MEDICINE
Payer: COMMERCIAL

## 2021-04-26 DIAGNOSIS — E78.01 FAMILIAL HYPERCHOLESTEROLEMIA: ICD-10-CM

## 2021-04-26 DIAGNOSIS — I42.8 NICM (NONISCHEMIC CARDIOMYOPATHY) (HCC): Primary | ICD-10-CM

## 2021-04-26 DIAGNOSIS — I50.9 ACUTE HEART FAILURE, UNSPECIFIED HEART FAILURE TYPE (HCC): ICD-10-CM

## 2021-04-26 DIAGNOSIS — Z12.5 PROSTATE CANCER SCREENING: ICD-10-CM

## 2021-04-26 LAB
ALBUMIN SERPL-MCNC: NORMAL G/DL
ALBUMIN/GLOB SERPL: NORMAL {RATIO}
ALP SERPL-CCNC: NORMAL U/L
ALT SERPL-CCNC: NORMAL U/L
ANION GAP SERPL CALC-SCNC: NORMAL MMOL/L
AST SERPL-CCNC: NORMAL U/L
BILIRUB SERPL-MCNC: NORMAL MG/DL
BUN SERPL-MCNC: 24 MG/DL
BUN/CREAT SERPL: NORMAL
CALCIUM SERPL-MCNC: 9.5 MG/DL
CALCIUM, CORRECTED: NORMAL
CHLORIDE SERPL-SCNC: 105 MMOL/L
CO2 SERPL-SCNC: NORMAL MMOL/L
CREAT SERPL-MCNC: 1.06 MG/DL
GLOBULIN SER-MCNC: NORMAL G/DL
GLUCOSE SERPL-MCNC: 75 MG/DL
LENGTH OF FAST TIME PATIENT: NORMAL H
POTASSIUM SERPL-SCNC: 3.8 MMOL/L
PROT SERPL-MCNC: NORMAL G/DL
SODIUM SERPL-SCNC: 137 MMOL/L

## 2021-04-26 PROCEDURE — 80048 BASIC METABOLIC PNL TOTAL CA: CPT

## 2021-04-26 PROCEDURE — 36415 COLL VENOUS BLD VENIPUNCTURE: CPT

## 2021-04-27 ENCOUNTER — CLINICAL ABSTRACT (OUTPATIENT)
Dept: CARDIOLOGY | Age: 51
End: 2021-04-27

## 2021-04-27 RX ORDER — SACUBITRIL AND VALSARTAN 49; 51 MG/1; MG/1
1 TABLET, FILM COATED ORAL 2 TIMES DAILY
Qty: 180 TABLET | Refills: 0 | Status: SHIPPED | OUTPATIENT
Start: 2021-04-27

## 2021-04-29 ENCOUNTER — TELEPHONE (OUTPATIENT)
Dept: CARDIOLOGY | Age: 51
End: 2021-04-29

## 2021-04-29 RX ORDER — METOPROLOL SUCCINATE 25 MG/1
37.5 TABLET, EXTENDED RELEASE ORAL DAILY
Qty: 135 TABLET | Refills: 0 | OUTPATIENT
Start: 2021-04-29

## 2021-04-29 RX ORDER — SPIRONOLACTONE 25 MG/1
TABLET ORAL
Qty: 45 TABLET | Refills: 0 | OUTPATIENT
Start: 2021-04-29

## 2021-04-29 NOTE — TELEPHONE ENCOUNTER
This patient not seen at [facility]. Patient seen at [facility] [facility]. Refill denied and message sent to pharmacy.

## 2021-05-20 ENCOUNTER — TELEPHONE (OUTPATIENT)
Dept: CARDIOLOGY | Age: 51
End: 2021-05-20

## 2021-06-03 ENCOUNTER — TELEPHONE (OUTPATIENT)
Dept: CARDIOLOGY | Age: 51
End: 2021-06-03

## 2021-06-03 NOTE — TELEPHONE ENCOUNTER
Pt called stating he was seen at [place] [address] for RTH Laceration about 2 weeks ago. Went back to  for SR. They told him to FU with hand specialist if he does not see improvement.  When pt called he did not want to make an appt, he wanted to talk to a RN to lewis Hemigard Intro: Due to skin fragility and wound tension, it was decided to use HEMIGARD adhesive retention suture devices to permit a linear closure. The skin was cleaned and dried for a 6cm distance away from the wound. Excessive hair, if present, was removed to allow for adhesion.

## 2021-06-04 ENCOUNTER — TELEPHONE (OUTPATIENT)
Dept: INTERNAL MEDICINE CLINIC | Facility: HOSPITAL | Age: 51
End: 2021-06-04

## 2021-06-04 NOTE — TELEPHONE ENCOUNTER
Department: Lab- [facility]                                 [] [facility]  []CHARMAINE   [x] [facility]    Dept Manager/Supervisor/team or clinical lead: edouard altman    Position:  [] MD     [] RN     [] Respiratory Therapist     [] PCT     [x] Other- lab    [address] to work? 6/4/21-- called in. Next shift 6/7/21    Did you have close contact with someone on your unit while not wearing a mask? (e.g., during meal breaks):  Yes []   No [x]    If yes, who:   Do you share a workspace?  Yes []   No [x]       If yes, with wh appointment for their testing and remain in their vehicle when arrived at site until they are contacted for testing    []  Plan noted above  []  Length of time to obtain results  []  Quarantine instructions  []  S/S of worsening infection/condition and imp

## 2021-06-17 ENCOUNTER — TELEPHONE (OUTPATIENT)
Dept: CARDIOLOGY | Age: 51
End: 2021-06-17

## 2021-06-17 RX ORDER — METOPROLOL SUCCINATE 50 MG/1
50 TABLET, EXTENDED RELEASE ORAL DAILY
Qty: 90 TABLET | Refills: 1 | Status: SHIPPED | OUTPATIENT
Start: 2021-06-17

## 2021-06-17 RX ORDER — METOPROLOL SUCCINATE 25 MG/1
TABLET, EXTENDED RELEASE ORAL
Qty: 135 TABLET | Refills: 0 | OUTPATIENT
Start: 2021-06-17

## 2021-06-17 RX ORDER — METOPROLOL SUCCINATE 25 MG/1
25 TABLET, EXTENDED RELEASE ORAL DAILY
Qty: 90 TABLET | Refills: 1 | Status: SHIPPED | OUTPATIENT
Start: 2021-06-17

## 2021-08-17 ENCOUNTER — LAB ENCOUNTER (OUTPATIENT)
Dept: LAB | Age: 51
End: 2021-08-17
Attending: INTERNAL MEDICINE
Payer: COMMERCIAL

## 2021-08-17 DIAGNOSIS — I26.93 SINGLE SUBSEGMENTAL PULMONARY EMBOLISM WITHOUT ACUTE COR PULMONALE (HCC): ICD-10-CM

## 2021-08-17 DIAGNOSIS — I10 HTN (HYPERTENSION): ICD-10-CM

## 2021-08-17 DIAGNOSIS — I42.8 OBSCURE CARDIOMYOPATHY OF AFRICA (HCC): ICD-10-CM

## 2021-08-17 DIAGNOSIS — I50.9 HEART FAILURE, UNSPECIFIED (HCC): Primary | ICD-10-CM

## 2021-08-17 LAB
ANION GAP SERPL CALC-SCNC: 4 MMOL/L (ref 0–18)
BASOPHILS # BLD AUTO: 0.05 X10(3) UL (ref 0–0.2)
BASOPHILS NFR BLD AUTO: 1.1 %
BUN BLD-MCNC: 20 MG/DL (ref 7–18)
BUN/CREAT SERPL: 23 (ref 10–20)
CALCIUM BLD-MCNC: 9.5 MG/DL (ref 8.5–10.1)
CHLORIDE SERPL-SCNC: 106 MMOL/L (ref 98–112)
CO2 SERPL-SCNC: 29 MMOL/L (ref 21–32)
CREAT BLD-MCNC: 0.87 MG/DL
DEPRECATED RDW RBC AUTO: 44.7 FL (ref 35.1–46.3)
EOSINOPHIL # BLD AUTO: 0.17 X10(3) UL (ref 0–0.7)
EOSINOPHIL NFR BLD AUTO: 3.7 %
ERYTHROCYTE [DISTWIDTH] IN BLOOD BY AUTOMATED COUNT: 12.5 % (ref 11–15)
GLUCOSE BLD-MCNC: 76 MG/DL (ref 70–99)
HCT VFR BLD AUTO: 44.3 %
HGB BLD-MCNC: 14.4 G/DL
IMM GRANULOCYTES # BLD AUTO: 0.02 X10(3) UL (ref 0–1)
IMM GRANULOCYTES NFR BLD: 0.4 %
LYMPHOCYTES # BLD AUTO: 1.5 X10(3) UL (ref 1–4)
LYMPHOCYTES NFR BLD AUTO: 33 %
MCH RBC QN AUTO: 31.6 PG (ref 26–34)
MCHC RBC AUTO-ENTMCNC: 32.5 G/DL (ref 31–37)
MCV RBC AUTO: 97.1 FL
MONOCYTES # BLD AUTO: 0.74 X10(3) UL (ref 0.1–1)
MONOCYTES NFR BLD AUTO: 16.3 %
NEUTROPHILS # BLD AUTO: 2.07 X10 (3) UL (ref 1.5–7.7)
NEUTROPHILS # BLD AUTO: 2.07 X10(3) UL (ref 1.5–7.7)
NEUTROPHILS NFR BLD AUTO: 45.5 %
OSMOLALITY SERPL CALC.SUM OF ELEC: 289 MOSM/KG (ref 275–295)
PATIENT FASTING Y/N/NP: NO
PLATELET # BLD AUTO: 255 10(3)UL (ref 150–450)
POTASSIUM SERPL-SCNC: 3.7 MMOL/L (ref 3.5–5.1)
RBC # BLD AUTO: 4.56 X10(6)UL
SODIUM SERPL-SCNC: 139 MMOL/L (ref 136–145)
WBC # BLD AUTO: 4.6 X10(3) UL (ref 4–11)

## 2021-08-17 PROCEDURE — 36415 COLL VENOUS BLD VENIPUNCTURE: CPT

## 2021-08-17 PROCEDURE — 80048 BASIC METABOLIC PNL TOTAL CA: CPT

## 2021-08-17 PROCEDURE — 85025 COMPLETE CBC W/AUTO DIFF WBC: CPT

## 2021-08-25 ENCOUNTER — TELEPHONE (OUTPATIENT)
Dept: GASTROENTEROLOGY | Facility: CLINIC | Age: 51
End: 2021-08-25

## 2021-08-25 ENCOUNTER — OFFICE VISIT (OUTPATIENT)
Dept: GASTROENTEROLOGY | Facility: CLINIC | Age: 51
End: 2021-08-25
Payer: COMMERCIAL

## 2021-08-25 VITALS
WEIGHT: 179 LBS | DIASTOLIC BLOOD PRESSURE: 70 MMHG | HEIGHT: 73 IN | BODY MASS INDEX: 23.72 KG/M2 | SYSTOLIC BLOOD PRESSURE: 110 MMHG

## 2021-08-25 DIAGNOSIS — I50.22 CHRONIC HFREF (HEART FAILURE WITH REDUCED EJECTION FRACTION) (HCC): Primary | ICD-10-CM

## 2021-08-25 DIAGNOSIS — R74.8 ABNORMAL TRANSAMINASES: ICD-10-CM

## 2021-08-25 DIAGNOSIS — Z12.11 SCREEN FOR COLON CANCER: Primary | ICD-10-CM

## 2021-08-25 DIAGNOSIS — I42.8 NICM (NONISCHEMIC CARDIOMYOPATHY) (HCC): ICD-10-CM

## 2021-08-25 DIAGNOSIS — F10.10 ETOH ABUSE: ICD-10-CM

## 2021-08-25 DIAGNOSIS — Z86.711 HISTORY OF PULMONARY EMBOLUS (PE): ICD-10-CM

## 2021-08-25 PROCEDURE — 3008F BODY MASS INDEX DOCD: CPT | Performed by: INTERNAL MEDICINE

## 2021-08-25 PROCEDURE — 3078F DIAST BP <80 MM HG: CPT | Performed by: INTERNAL MEDICINE

## 2021-08-25 PROCEDURE — 99244 OFF/OP CNSLTJ NEW/EST MOD 40: CPT | Performed by: INTERNAL MEDICINE

## 2021-08-25 PROCEDURE — 3074F SYST BP LT 130 MM HG: CPT | Performed by: INTERNAL MEDICINE

## 2021-08-25 RX ORDER — SACUBITRIL AND VALSARTAN 49; 51 MG/1; MG/1
1 TABLET, FILM COATED ORAL 2 TIMES DAILY
COMMUNITY
Start: 2021-07-06

## 2021-08-25 RX ORDER — METOPROLOL SUCCINATE 100 MG/1
100 TABLET, EXTENDED RELEASE ORAL DAILY
COMMUNITY
Start: 2021-07-22

## 2021-08-25 RX ORDER — POLYETHYLENE GLYCOL 3350, SODIUM CHLORIDE, SODIUM BICARBONATE, POTASSIUM CHLORIDE 420; 11.2; 5.72; 1.48 G/4L; G/4L; G/4L; G/4L
POWDER, FOR SOLUTION ORAL
Qty: 4000 ML | Refills: 0 | Status: SHIPPED | OUTPATIENT
Start: 2021-08-25

## 2021-08-25 NOTE — TELEPHONE ENCOUNTER
Scheduled for:  Colonoscopy 87097  Provider Name:  Dr. Tiffany Ordonez  Date:  12/07/2021  Location:  [facility]  Sedation:  MAC  Time:  1:30 pm, arrival 12:30 pm  Prep:  Golytely  Meds/Allergies Reconciled?:  Physician reviewed  Diagnosis with codes:  Screening for colon

## 2021-08-25 NOTE — PATIENT INSTRUCTIONS
GI schedulers –    Please schedule colonoscopy exam at [facility] - early December    Body mass index is 23.62 kg/m².     MAC anesthesia    Golytely (PEG) 4L bowel prep  Rx sent in to [facility]      DX = Screening colonoscopy    Medication instructions:    Guerda Davis

## 2021-08-25 NOTE — PROGRESS NOTES
HPI:    Patient ID: Jasmeet Grey is a 45year old man with complex history of severe dilated cardiomyopathy apparently due to a viral syndrome late 1990s and alcohol abuse.     He presented with severe lower extremity and scrotal edema January 2021, madeleineu n  Previous colonoscopy(ies): n    HPI    Review of Systems    ====================    1/20/2021: CT CHEST PE AORTA (IV ONLY) (CPT=71260)       COMPARISON: [facility], XR CHEST AP PORTABLE (CPT=71045), 1/20/2021, 10:21 AM.       INDICATIONS consolidationBelvia Christians smooth septal interstitial thickening as well as small to moderate right and small left pleural effusions.  Overall these findings appear   to reflect CHF/fluid overload with mild suspected edema.  Patchy ground-glass opacity and nodul systolic  function. LVEF =  29%. Global hypokinesis; there is no significant regional  variation.    2.   The right ventricle is normal in size with normal systolic function. RVEF = 51%. 3.   Left atrial enlargement.    4.   There is mid-wall fibrosis in (12.5 mg total) by mouth daily. 30 tablet 2   • Metoprolol Succinate ER 50 MG Oral Tablet 24 Hr Take 75 mg by mouth Daily Beta Blocker.   (Patient not taking: Reported on 8/25/2021 )     • Sacubitril-Valsartan (ENTRESTO) 24-26 MG Oral Tab Take 1 tablet by m chest of 1/20/2021 and 2/25/2021  Now abstaining from alcohol since hospitalization of January 2021  Continue to monitor LFTs every 1-2 years    3.   Colon cancer screening, average risk  Due for screening colonoscopy examination  Profound dilated cardiomyo

## 2021-11-23 ENCOUNTER — TELEPHONE (OUTPATIENT)
Dept: GASTROENTEROLOGY | Facility: CLINIC | Age: 51
End: 2021-11-23

## 2021-11-23 DIAGNOSIS — Z12.11 COLON CANCER SCREENING: Primary | ICD-10-CM

## 2021-11-23 NOTE — TELEPHONE ENCOUNTER
Spoke with pt, he is cancelling due to transportation, he will call back when he is able to reschedule. Canceled in [address]. TE closed.       Canceled for:  Colonoscopy - 38872  Provider Name:  Dr. Arsenio Elliott  Date:  12/07/21  Location:  [facility]  Sedation

## 2022-03-29 ENCOUNTER — HOSPITAL ENCOUNTER (OUTPATIENT)
Age: 52
Discharge: LEFT BEFORE DISCHARGE | End: 2022-03-29
Payer: COMMERCIAL

## 2022-03-29 ENCOUNTER — IMMUNIZATION (OUTPATIENT)
Dept: LAB | Age: 52
End: 2022-03-29
Attending: INTERNAL MEDICINE
Payer: COMMERCIAL

## 2022-03-29 ENCOUNTER — LAB ENCOUNTER (OUTPATIENT)
Dept: LAB | Age: 52
End: 2022-03-29
Attending: INTERNAL MEDICINE
Payer: COMMERCIAL

## 2022-03-29 DIAGNOSIS — I50.9 ACUTE ON CHRONIC CONGESTIVE HEART FAILURE (HCC): Primary | ICD-10-CM

## 2022-03-29 DIAGNOSIS — E78.01 FAMILIAL HYPERCHOLESTEROLEMIA: ICD-10-CM

## 2022-03-29 DIAGNOSIS — I42.8 NICM (NONISCHEMIC CARDIOMYOPATHY) (HCC): ICD-10-CM

## 2022-03-29 DIAGNOSIS — Z23 NEED FOR VACCINATION: Primary | ICD-10-CM

## 2022-03-29 LAB
ANION GAP SERPL CALC-SCNC: 10 MMOL/L (ref 0–18)
BASOPHILS # BLD AUTO: 0.04 X10(3) UL (ref 0–0.2)
BASOPHILS NFR BLD AUTO: 0.9 %
BUN BLD-MCNC: 14 MG/DL (ref 7–18)
BUN/CREAT SERPL: 13.3 (ref 10–20)
CALCIUM BLD-MCNC: 9.8 MG/DL (ref 8.5–10.1)
CHLORIDE SERPL-SCNC: 104 MMOL/L (ref 98–112)
CO2 SERPL-SCNC: 25 MMOL/L (ref 21–32)
CREAT BLD-MCNC: 1.05 MG/DL
DEPRECATED RDW RBC AUTO: 45.1 FL (ref 35.1–46.3)
EOSINOPHIL # BLD AUTO: 0.13 X10(3) UL (ref 0–0.7)
EOSINOPHIL NFR BLD AUTO: 2.9 %
ERYTHROCYTE [DISTWIDTH] IN BLOOD BY AUTOMATED COUNT: 12 % (ref 11–15)
FASTING STATUS PATIENT QL REPORTED: NO
GLUCOSE BLD-MCNC: 100 MG/DL (ref 70–99)
HCT VFR BLD AUTO: 50 %
HGB BLD-MCNC: 16.2 G/DL
IMM GRANULOCYTES # BLD AUTO: 0.01 X10(3) UL (ref 0–1)
IMM GRANULOCYTES NFR BLD: 0.2 %
LYMPHOCYTES # BLD AUTO: 1.09 X10(3) UL (ref 1–4)
LYMPHOCYTES NFR BLD AUTO: 24.7 %
MCH RBC QN AUTO: 32.3 PG (ref 26–34)
MCHC RBC AUTO-ENTMCNC: 32.4 G/DL (ref 31–37)
MCV RBC AUTO: 99.8 FL
MONOCYTES # BLD AUTO: 0.58 X10(3) UL (ref 0.1–1)
MONOCYTES NFR BLD AUTO: 13.2 %
NEUTROPHILS # BLD AUTO: 2.56 X10 (3) UL (ref 1.5–7.7)
NEUTROPHILS # BLD AUTO: 2.56 X10(3) UL (ref 1.5–7.7)
NEUTROPHILS NFR BLD AUTO: 58.1 %
OSMOLALITY SERPL CALC.SUM OF ELEC: 289 MOSM/KG (ref 275–295)
PLATELET # BLD AUTO: 266 10(3)UL (ref 150–450)
POTASSIUM SERPL-SCNC: 4.2 MMOL/L (ref 3.5–5.1)
RBC # BLD AUTO: 5.01 X10(6)UL
SODIUM SERPL-SCNC: 139 MMOL/L (ref 136–145)
WBC # BLD AUTO: 4.4 X10(3) UL (ref 4–11)

## 2022-03-29 PROCEDURE — 80048 BASIC METABOLIC PNL TOTAL CA: CPT

## 2022-03-29 PROCEDURE — 0031A HC JANSSEN COVID-19 VACCINE ADMIN 1ST DOSE: CPT

## 2022-03-29 PROCEDURE — 0064A SARSCOV2 VAC 50MCG/0.25ML IM: CPT

## 2022-03-29 PROCEDURE — 36415 COLL VENOUS BLD VENIPUNCTURE: CPT

## 2022-03-29 PROCEDURE — 85025 COMPLETE CBC W/AUTO DIFF WBC: CPT

## 2022-06-14 ENCOUNTER — OFFICE VISIT (OUTPATIENT)
Dept: FAMILY MEDICINE CLINIC | Facility: CLINIC | Age: 52
End: 2022-06-14
Payer: COMMERCIAL

## 2022-06-14 VITALS
WEIGHT: 190.5 LBS | SYSTOLIC BLOOD PRESSURE: 110 MMHG | HEART RATE: 90 BPM | HEIGHT: 73 IN | DIASTOLIC BLOOD PRESSURE: 75 MMHG | RESPIRATION RATE: 17 BRPM | BODY MASS INDEX: 25.25 KG/M2

## 2022-06-14 DIAGNOSIS — E78.01 FAMILIAL HYPERCHOLESTEROLEMIA: ICD-10-CM

## 2022-06-14 DIAGNOSIS — R91.1 PULMONARY NODULE: ICD-10-CM

## 2022-06-14 DIAGNOSIS — R73.09 ELEVATED GLUCOSE: ICD-10-CM

## 2022-06-14 DIAGNOSIS — Z12.11 SCREEN FOR COLON CANCER: ICD-10-CM

## 2022-06-14 DIAGNOSIS — Z86.711 HISTORY OF PULMONARY EMBOLUS (PE): ICD-10-CM

## 2022-06-14 DIAGNOSIS — Z00.00 WELLNESS EXAMINATION: Primary | ICD-10-CM

## 2022-06-14 PROCEDURE — 3008F BODY MASS INDEX DOCD: CPT | Performed by: FAMILY MEDICINE

## 2022-06-14 PROCEDURE — 3078F DIAST BP <80 MM HG: CPT | Performed by: FAMILY MEDICINE

## 2022-06-14 PROCEDURE — 99213 OFFICE O/P EST LOW 20 MIN: CPT | Performed by: FAMILY MEDICINE

## 2022-06-14 PROCEDURE — 99396 PREV VISIT EST AGE 40-64: CPT | Performed by: FAMILY MEDICINE

## 2022-06-14 PROCEDURE — 3074F SYST BP LT 130 MM HG: CPT | Performed by: FAMILY MEDICINE

## 2022-06-23 ENCOUNTER — TELEPHONE (OUTPATIENT)
Dept: FAMILY MEDICINE CLINIC | Facility: CLINIC | Age: 52
End: 2022-06-23

## 2022-06-23 NOTE — TELEPHONE ENCOUNTER
Tried to contact patient to inform authorization approved for CT chest scheduled for 6/27. Left detailed message on preferred number. Call back if any questions.

## 2022-06-23 NOTE — TELEPHONE ENCOUNTER
Esteban Arteaga from [employer] is requesting a peer to peer with [facility] prior to patient's scheduled chest CT scheduled on 6/27/22    Please call [facility] at [phone]    REF #[id number]    Any questions, please call [phone]

## 2022-06-27 ENCOUNTER — TELEPHONE (OUTPATIENT)
Dept: FAMILY MEDICINE CLINIC | Facility: CLINIC | Age: 52
End: 2022-06-27

## 2022-06-27 ENCOUNTER — HOSPITAL ENCOUNTER (OUTPATIENT)
Dept: CT IMAGING | Age: 52
Discharge: HOME | End: 2022-06-27
Attending: FAMILY MEDICINE
Payer: COMMERCIAL

## 2022-06-27 DIAGNOSIS — R91.1 PULMONARY NODULE: ICD-10-CM

## 2022-06-27 PROCEDURE — 71250 CT THORAX DX C-: CPT | Performed by: FAMILY MEDICINE

## 2022-06-27 NOTE — TELEPHONE ENCOUNTER
Received blood work for patient with noted elevated cholesterol, triglycerides as well as LDL in the level of 187, patient was started on rosuvastatin 20 mg per cardiology.

## 2022-07-15 ENCOUNTER — PATIENT MESSAGE (OUTPATIENT)
Dept: FAMILY MEDICINE CLINIC | Facility: CLINIC | Age: 52
End: 2022-07-15

## 2022-07-16 NOTE — TELEPHONE ENCOUNTER
DR Figueroa=see message and advice,thanks    From: Terri Hankins  To: Kristal Dumont MD  Sent: 7/15/2022 10:29 PM CDT  Subject: Continuation of Xarelto 20mg    Dr. Jessica Freeman-     Dr. Tonia Kahn, my Cardiologist, suggested that I ask you if I need to continue on Xarelto 20Mg daily? I try to be more active daily.     Thanks - miranda

## 2022-07-18 NOTE — TELEPHONE ENCOUNTER
Reviewed initial admission in January 2021, patient had PE at that time as well as decompensated heart failure, per pulmonology patient had to be at least 3 months of anticoagulation. Per last cardiology note patient is stable and controlled regarding heart failure. Okay to discontinue anticoagulation.   Patient informed

## 2022-08-24 ENCOUNTER — TELEPHONE (OUTPATIENT)
Dept: FAMILY MEDICINE CLINIC | Facility: CLINIC | Age: 52
End: 2022-08-24

## 2022-08-24 LAB — AMB EXT COLOGUARD RESULT: NEGATIVE

## 2022-08-25 ENCOUNTER — LAB ENCOUNTER (OUTPATIENT)
Dept: LAB | Age: 52
End: 2022-08-25
Attending: INTERNAL MEDICINE
Payer: COMMERCIAL

## 2022-08-25 DIAGNOSIS — I42.8 NONISCHEMIC CARDIOMYOPATHY (HCC): ICD-10-CM

## 2022-08-25 DIAGNOSIS — E78.01 FAMILIAL HYPERCHOLESTEROLEMIA: Primary | ICD-10-CM

## 2022-08-25 LAB
ALT SERPL-CCNC: 76 U/L
AST SERPL-CCNC: 56 U/L (ref 15–37)
CHOLEST SERPL-MCNC: 205 MG/DL (ref ?–200)
FASTING PATIENT LIPID ANSWER: YES
HDLC SERPL-MCNC: 94 MG/DL (ref 40–59)
LDLC SERPL CALC-MCNC: 100 MG/DL (ref ?–100)
NONHDLC SERPL-MCNC: 111 MG/DL (ref ?–130)
TRIGL SERPL-MCNC: 59 MG/DL (ref 30–149)
VLDLC SERPL CALC-MCNC: 10 MG/DL (ref 0–30)

## 2022-08-25 PROCEDURE — 84460 ALANINE AMINO (ALT) (SGPT): CPT

## 2022-08-25 PROCEDURE — 84450 TRANSFERASE (AST) (SGOT): CPT

## 2022-08-25 PROCEDURE — 80061 LIPID PANEL: CPT

## 2022-08-25 PROCEDURE — 36415 COLL VENOUS BLD VENIPUNCTURE: CPT

## 2023-10-09 ENCOUNTER — TELEPHONE (OUTPATIENT)
Dept: DERMATOLOGY CLINIC | Facility: CLINIC | Age: 53
End: 2023-10-09

## 2023-10-09 ENCOUNTER — OFFICE VISIT (OUTPATIENT)
Dept: FAMILY MEDICINE CLINIC | Facility: CLINIC | Age: 53
End: 2023-10-09

## 2023-10-09 VITALS
RESPIRATION RATE: 17 BRPM | DIASTOLIC BLOOD PRESSURE: 78 MMHG | HEIGHT: 73 IN | HEART RATE: 87 BPM | BODY MASS INDEX: 25.89 KG/M2 | WEIGHT: 195.38 LBS | SYSTOLIC BLOOD PRESSURE: 112 MMHG

## 2023-10-09 DIAGNOSIS — R91.1 LUNG NODULE: ICD-10-CM

## 2023-10-09 DIAGNOSIS — Z00.00 WELLNESS EXAMINATION: Primary | ICD-10-CM

## 2023-10-09 DIAGNOSIS — E78.01 FAMILIAL HYPERCHOLESTEROLEMIA: ICD-10-CM

## 2023-10-09 PROCEDURE — 3074F SYST BP LT 130 MM HG: CPT | Performed by: FAMILY MEDICINE

## 2023-10-09 PROCEDURE — 3008F BODY MASS INDEX DOCD: CPT | Performed by: FAMILY MEDICINE

## 2023-10-09 PROCEDURE — 99396 PREV VISIT EST AGE 40-64: CPT | Performed by: FAMILY MEDICINE

## 2023-10-09 PROCEDURE — 3078F DIAST BP <80 MM HG: CPT | Performed by: FAMILY MEDICINE

## 2023-10-09 RX ORDER — RIVAROXABAN 20 MG/1
TABLET, FILM COATED ORAL
COMMUNITY
Start: 2021-01-30

## 2023-10-09 RX ORDER — BEMPEDOIC ACID 180 MG/1
1 TABLET, FILM COATED ORAL DAILY
Qty: 90 TABLET | Refills: 0 | Status: SHIPPED | OUTPATIENT
Start: 2023-10-09 | End: 2024-01-07

## 2023-10-09 RX ORDER — ROSUVASTATIN CALCIUM 20 MG/1
TABLET, COATED ORAL
COMMUNITY
Start: 2021-01-30

## 2023-10-09 NOTE — TELEPHONE ENCOUNTER
Bempedoic Acid (NEXLETOL) 180 MG Oral Tab, Take 1 tablet by mouth daily. , Disp: 90 tablet, Rfl: 0      Pharmacy message: [place] medication. Please call plan at [phone] to initiate prior authorization or call/fax pharmacy to change medication.

## 2023-10-27 ENCOUNTER — TELEPHONE (OUTPATIENT)
Dept: FAMILY MEDICINE CLINIC | Facility: CLINIC | Age: 53
End: 2023-10-27

## 2023-10-27 NOTE — TELEPHONE ENCOUNTER
Prior authorization        XARELTO 20 MG Oral Tab, , Disp: , Rfl:     Bempedoic Acid (NEXLETOL) 180 MG Oral Tab, Take 1 tablet by mouth daily. , Disp: 90 tablet, Rfl:     Plan does not cover this medication

## 2024-05-17 ENCOUNTER — HOSPITAL ENCOUNTER (OUTPATIENT)
Dept: CT IMAGING | Age: 54
Discharge: HOME | End: 2024-05-17
Attending: INTERNAL MEDICINE

## 2024-05-17 DIAGNOSIS — Z13.6 ENCOUNTER FOR SCREENING FOR CARDIOVASCULAR DISORDERS: ICD-10-CM

## 2024-05-17 NOTE — PROGRESS NOTES
Date of Service 5/17/2024    TAMMY MILLS  Date of Birth 3/23/1970    Patient Age: 54 year old    PCP: Shiloh Leung MD  [address] [facility] [address]  [place] [address]    Heart Scan Consult  Preliminary Heart Scan Score: 0    Previous Screening  Heart Scan Completed Previously: No                   Risk Factors  Personal Risk Factors  Non-alterable Risk Factors: Personal History (Dilated Cardiomyopathy)  Alterable Risk Factors: Abnormal Cholesterol      Body Mass Index  BMI 25    Blood Pressure  /80  (Normal =< 120/80,  Elevated = 120-129/ >80,  High Stage1 130-139/80-89 , Stage2 >140/>90)    Lipid Profile  Cholesterol: 205, done on 8/25/2022.  HDL Cholesterol: 94, done on 8/25/2022.  LDL Cholesterol: 100, done on 8/25/2022.  TriGlycerides 59, done on 8/25/2022.  Has recent results and states that Dr. Smallwood has them.  They are watching his LDL.  He is on med.    Cholesterol Goals  Value   Total  =< 200   HDL  = > 45 Men = > 55 Women   LDL   =< 100   Triglycerides  =< 150       Glucose and Hemoglobin A1C  Lab Results   Component Value Date     (H) 03/29/2022     (Normal Fasting Glucose < 100mg/dl )    Nurse Review  Risk factor information and results reviewed with Nurse: Yes    Recommended Follow Up:  Consult your physician regarding:: Final Heart Scan Report;Discuss potential for Incidental Finding      Recommendations for Change:  Nutrition Changes: Low Saturated Fat    Cholesterol Modification (goal of therapy depends upon your risk): Decrease LDL (Lousy/Bad) Ideal <100    Exercise: Enhance Current Program    Smoking Cessation: No Change Needed    Weight Management: Decrease Current Weight    Stress Management: No Change Needed    Repeat Heart Scan: 5 years if Calcium Score is 0.0;Discuss with your Physician              [place] Recommended Resources:  Recommended Resources: Upcoming Classes, Medical Services and Health Library [email or web address]            Vane VIDALES RN        Please  Contact the Nurse Heart Line with any Questions or Concerns [phone].

## (undated) NOTE — LETTER
Date: 1/29/2021    Patient Name: Margarita Abbott          To Whom it may concern: This letter has been written at the patient's request. The above patient was seen at the [facility] for treatment of a medical condition.     This patient

## (undated) NOTE — LETTER
Date & Time: 1/20/2021, 12:42 PM  Patient: Brian Irvin  Encounter Provider(s):    Lavell Knox MD         This certifies that Geovanny Roz, a patient at [facility], am leaving the facility voluntarily and against

## (undated) NOTE — LETTER
[facility], [place]  Authorization for Invasive Procedures  1.  I hereby authorize Dr. Janes Berrios , my physician and whomever may be designated as the doctor's assistant, to perform the following operation and/or procedure: Cardiac c 4. Should the need arise during my operation or immediate post-operative period; I also consent to the administration of blood and/or blood products.  Further, I understand that despite careful testing and screening of blood and blood products, I may still 9. Patients having a sterilization procedure: I understand that if the procedure is successful the results will be permanent and it will therefore be impossible for me to inseminate, conceive or bear children.  I also understand that the procedure is intend

## (undated) NOTE — LETTER
Date: 2/5/2021    Patient Name: Lizbeth Pang          To Whom it may concern: This letter has been written at the patient's request. The above patient was seen at the [facility] for treatment of a medical condition.     This patient s